# Patient Record
Sex: FEMALE | Race: BLACK OR AFRICAN AMERICAN | Employment: UNEMPLOYED | URBAN - METROPOLITAN AREA
[De-identification: names, ages, dates, MRNs, and addresses within clinical notes are randomized per-mention and may not be internally consistent; named-entity substitution may affect disease eponyms.]

---

## 2018-09-08 ENCOUNTER — APPOINTMENT (OUTPATIENT)
Dept: GENERAL RADIOLOGY | Age: 58
End: 2018-09-08
Attending: EMERGENCY MEDICINE
Payer: OTHER GOVERNMENT

## 2018-09-08 ENCOUNTER — HOSPITAL ENCOUNTER (EMERGENCY)
Age: 58
Discharge: ANOTHER HOSPITAL | End: 2018-09-10
Attending: EMERGENCY MEDICINE
Payer: OTHER GOVERNMENT

## 2018-09-08 DIAGNOSIS — R44.0 AUDITORY HALLUCINATION: ICD-10-CM

## 2018-09-08 DIAGNOSIS — R45.851 SUICIDAL IDEATION: Primary | ICD-10-CM

## 2018-09-08 LAB
AMPHET UR QL SCN: NEGATIVE
ANION GAP SERPL CALC-SCNC: 9 MMOL/L (ref 3–18)
BARBITURATES UR QL SCN: NEGATIVE
BASOPHILS # BLD: 0 K/UL (ref 0–0.1)
BASOPHILS NFR BLD: 0 % (ref 0–2)
BENZODIAZ UR QL: NEGATIVE
BUN SERPL-MCNC: 27 MG/DL (ref 7–18)
BUN/CREAT SERPL: 24 (ref 12–20)
CALCIUM SERPL-MCNC: 9.2 MG/DL (ref 8.5–10.1)
CANNABINOIDS UR QL SCN: NEGATIVE
CHLORIDE SERPL-SCNC: 103 MMOL/L (ref 100–108)
CO2 SERPL-SCNC: 28 MMOL/L (ref 21–32)
COCAINE UR QL SCN: POSITIVE
CREAT SERPL-MCNC: 1.13 MG/DL (ref 0.6–1.3)
DIFFERENTIAL METHOD BLD: ABNORMAL
EOSINOPHIL # BLD: 0.2 K/UL (ref 0–0.4)
EOSINOPHIL NFR BLD: 2 % (ref 0–5)
ERYTHROCYTE [DISTWIDTH] IN BLOOD BY AUTOMATED COUNT: 15.4 % (ref 11.6–14.5)
ETHANOL SERPL-MCNC: <3 MG/DL (ref 0–3)
GLUCOSE SERPL-MCNC: 131 MG/DL (ref 74–99)
HCT VFR BLD AUTO: 36.9 % (ref 35–45)
HDSCOM,HDSCOM: ABNORMAL
HGB BLD-MCNC: 12.3 G/DL (ref 12–16)
LYMPHOCYTES # BLD: 2.9 K/UL (ref 0.9–3.6)
LYMPHOCYTES NFR BLD: 24 % (ref 21–52)
MCH RBC QN AUTO: 31.8 PG (ref 24–34)
MCHC RBC AUTO-ENTMCNC: 33.3 G/DL (ref 31–37)
MCV RBC AUTO: 95.3 FL (ref 74–97)
METHADONE UR QL: NEGATIVE
MONOCYTES # BLD: 0.8 K/UL (ref 0.05–1.2)
MONOCYTES NFR BLD: 7 % (ref 3–10)
NEUTS SEG # BLD: 8.1 K/UL (ref 1.8–8)
NEUTS SEG NFR BLD: 67 % (ref 40–73)
OPIATES UR QL: NEGATIVE
PCP UR QL: NEGATIVE
PLATELET # BLD AUTO: 306 K/UL (ref 135–420)
PMV BLD AUTO: 10.2 FL (ref 9.2–11.8)
POTASSIUM SERPL-SCNC: 3.8 MMOL/L (ref 3.5–5.5)
RBC # BLD AUTO: 3.87 M/UL (ref 4.2–5.3)
SODIUM SERPL-SCNC: 140 MMOL/L (ref 136–145)
TROPONIN I SERPL-MCNC: <0.02 NG/ML (ref 0–0.04)
WBC # BLD AUTO: 12.1 K/UL (ref 4.6–13.2)

## 2018-09-08 PROCEDURE — 99285 EMERGENCY DEPT VISIT HI MDM: CPT

## 2018-09-08 PROCEDURE — 85025 COMPLETE CBC W/AUTO DIFF WBC: CPT | Performed by: EMERGENCY MEDICINE

## 2018-09-08 PROCEDURE — 80048 BASIC METABOLIC PNL TOTAL CA: CPT | Performed by: EMERGENCY MEDICINE

## 2018-09-08 PROCEDURE — 71045 X-RAY EXAM CHEST 1 VIEW: CPT

## 2018-09-08 PROCEDURE — 93005 ELECTROCARDIOGRAM TRACING: CPT

## 2018-09-08 PROCEDURE — 96375 TX/PRO/DX INJ NEW DRUG ADDON: CPT

## 2018-09-08 PROCEDURE — 74011250637 HC RX REV CODE- 250/637: Performed by: PHYSICIAN ASSISTANT

## 2018-09-08 PROCEDURE — 80307 DRUG TEST PRSMV CHEM ANLYZR: CPT | Performed by: EMERGENCY MEDICINE

## 2018-09-08 PROCEDURE — 96361 HYDRATE IV INFUSION ADD-ON: CPT

## 2018-09-08 PROCEDURE — 96374 THER/PROPH/DIAG INJ IV PUSH: CPT

## 2018-09-08 PROCEDURE — 84484 ASSAY OF TROPONIN QUANT: CPT | Performed by: EMERGENCY MEDICINE

## 2018-09-08 PROCEDURE — 74011250636 HC RX REV CODE- 250/636: Performed by: EMERGENCY MEDICINE

## 2018-09-08 RX ORDER — GABAPENTIN 300 MG/1
300 CAPSULE ORAL 3 TIMES DAILY
Status: DISCONTINUED | OUTPATIENT
Start: 2018-09-08 | End: 2018-09-10 | Stop reason: HOSPADM

## 2018-09-08 RX ORDER — ACETAMINOPHEN 500 MG
1000 TABLET ORAL
Status: COMPLETED | OUTPATIENT
Start: 2018-09-08 | End: 2018-09-09

## 2018-09-08 RX ORDER — OMEPRAZOLE 20 MG/1
20 CAPSULE, DELAYED RELEASE ORAL DAILY
COMMUNITY
End: 2018-09-18

## 2018-09-08 RX ORDER — HALOPERIDOL 5 MG/ML
5 INJECTION INTRAMUSCULAR
Status: COMPLETED | OUTPATIENT
Start: 2018-09-08 | End: 2018-09-08

## 2018-09-08 RX ORDER — DIPHENHYDRAMINE HYDROCHLORIDE 50 MG/ML
25 INJECTION, SOLUTION INTRAMUSCULAR; INTRAVENOUS ONCE
Status: COMPLETED | OUTPATIENT
Start: 2018-09-08 | End: 2018-09-08

## 2018-09-08 RX ORDER — DICLOFENAC SODIUM 10 MG/G
2 GEL TOPICAL 4 TIMES DAILY
COMMUNITY

## 2018-09-08 RX ORDER — TRAZODONE HYDROCHLORIDE 50 MG/1
100 TABLET ORAL
Status: DISCONTINUED | OUTPATIENT
Start: 2018-09-08 | End: 2018-09-10 | Stop reason: HOSPADM

## 2018-09-08 RX ORDER — DIVALPROEX SODIUM 500 MG/1
500 TABLET, EXTENDED RELEASE ORAL DAILY
COMMUNITY
End: 2018-09-18

## 2018-09-08 RX ORDER — DIVALPROEX SODIUM 250 MG/1
500 TABLET, DELAYED RELEASE ORAL DAILY
Status: DISCONTINUED | OUTPATIENT
Start: 2018-09-09 | End: 2018-09-10 | Stop reason: HOSPADM

## 2018-09-08 RX ORDER — GLUCOSAMINE SULFATE 1500 MG
1000 POWDER IN PACKET (EA) ORAL DAILY
COMMUNITY

## 2018-09-08 RX ORDER — METFORMIN HYDROCHLORIDE 500 MG/1
500 TABLET, FILM COATED, EXTENDED RELEASE ORAL
COMMUNITY
End: 2018-09-18

## 2018-09-08 RX ORDER — ATENOLOL 25 MG/1
25 TABLET ORAL DAILY
COMMUNITY
End: 2018-09-18

## 2018-09-08 RX ORDER — AMLODIPINE BESYLATE 5 MG/1
10 TABLET ORAL DAILY
Status: DISCONTINUED | OUTPATIENT
Start: 2018-09-08 | End: 2018-09-10 | Stop reason: HOSPADM

## 2018-09-08 RX ORDER — METFORMIN HYDROCHLORIDE 500 MG/1
500 TABLET, EXTENDED RELEASE ORAL
Status: DISCONTINUED | OUTPATIENT
Start: 2018-09-08 | End: 2018-09-10 | Stop reason: HOSPADM

## 2018-09-08 RX ORDER — AMLODIPINE BESYLATE 10 MG/1
10 TABLET ORAL DAILY
COMMUNITY
End: 2018-09-18

## 2018-09-08 RX ORDER — FAMOTIDINE 20 MG/1
20 TABLET, FILM COATED ORAL DAILY
Status: DISCONTINUED | OUTPATIENT
Start: 2018-09-09 | End: 2018-09-10 | Stop reason: HOSPADM

## 2018-09-08 RX ORDER — LANOLIN ALCOHOL/MO/W.PET/CERES
3 CREAM (GRAM) TOPICAL
Status: DISCONTINUED | OUTPATIENT
Start: 2018-09-08 | End: 2018-09-10 | Stop reason: HOSPADM

## 2018-09-08 RX ORDER — LANOLIN ALCOHOL/MO/W.PET/CERES
3 CREAM (GRAM) TOPICAL
COMMUNITY

## 2018-09-08 RX ORDER — GABAPENTIN 300 MG/1
300 CAPSULE ORAL 3 TIMES DAILY
COMMUNITY
End: 2018-09-18

## 2018-09-08 RX ORDER — TRAZODONE HYDROCHLORIDE 100 MG/1
100 TABLET ORAL
COMMUNITY
End: 2018-09-18

## 2018-09-08 RX ORDER — ATENOLOL 25 MG/1
25 TABLET ORAL
Status: COMPLETED | OUTPATIENT
Start: 2018-09-08 | End: 2018-09-08

## 2018-09-08 RX ORDER — ALBUTEROL SULFATE 90 UG/1
2 AEROSOL, METERED RESPIRATORY (INHALATION)
COMMUNITY

## 2018-09-08 RX ADMIN — ATENOLOL 25 MG: 25 TABLET ORAL at 19:18

## 2018-09-08 RX ADMIN — DIPHENHYDRAMINE HYDROCHLORIDE 25 MG: 50 INJECTION, SOLUTION INTRAMUSCULAR; INTRAVENOUS at 15:17

## 2018-09-08 RX ADMIN — AMLODIPINE BESYLATE 10 MG: 5 TABLET ORAL at 19:18

## 2018-09-08 RX ADMIN — HALOPERIDOL LACTATE 5 MG: 5 INJECTION, SOLUTION INTRAMUSCULAR at 15:19

## 2018-09-08 RX ADMIN — SODIUM CHLORIDE 1000 ML: 900 INJECTION, SOLUTION INTRAVENOUS at 15:16

## 2018-09-08 RX ADMIN — METFORMIN HYDROCHLORIDE 500 MG: 500 TABLET, EXTENDED RELEASE ORAL at 19:17

## 2018-09-08 NOTE — ED NOTES
PROGRESS NOTES 
 
3:30 PM 
Assumed care of patient from Dr. Dennis Najera at 3pm.  Examined patient at bedside, she is very tired from the meds. CXR no acute process, no white count to suggest infectious source. + cocaine, likely source of tachycardia along with anxiety and distress. Trop negative. EKG NSR. Medically cleared. Consulted tele-psych. Xr Chest [facility] Result Date: 9/8/2018 Chest, single view Indication: Chest pain. Comparison: None Findings:  Portable upright AP view of the chest was obtained. The lungs are underexpanded. Basilar crowding of bronchovascular structures present. There is no focal pneumonic consolidation, pneumothorax, or pleural effusion. Cardiac size and mediastinal contours appear within normal limits. No acute osseous normality demonstrated. Impression: Underexpanded lungs with associated basilar crowding of bronchovascular structures. No superimposed acute radiographic abnormality. Recent Results (from the past 12 hour(s)) CBC WITH AUTOMATED DIFF Collection Time: 09/08/18  1:45 PM  
Result Value Ref Range WBC 12.1 4.6 - 13.2 K/uL  
 RBC 3.87 (L) 4.20 - 5.30 M/uL  
 HGB 12.3 12.0 - 16.0 g/dL HCT 36.9 35.0 - 45.0 % MCV 95.3 74.0 - 97.0 FL  
 MCH 31.8 24.0 - 34.0 PG  
 MCHC 33.3 31.0 - 37.0 g/dL  
 RDW 15.4 (H) 11.6 - 14.5 % PLATELET 733 989 - 683 K/uL MPV 10.2 9.2 - 11.8 FL  
 NEUTROPHILS 67 40 - 73 % LYMPHOCYTES 24 21 - 52 % MONOCYTES 7 3 - 10 % EOSINOPHILS 2 0 - 5 % BASOPHILS 0 0 - 2 %  
 ABS. NEUTROPHILS 8.1 (H) 1.8 - 8.0 K/UL  
 ABS. LYMPHOCYTES 2.9 0.9 - 3.6 K/UL  
 ABS. MONOCYTES 0.8 0.05 - 1.2 K/UL  
 ABS. EOSINOPHILS 0.2 0.0 - 0.4 K/UL  
 ABS. BASOPHILS 0.0 0.0 - 0.1 K/UL  
 DF AUTOMATED METABOLIC PANEL, BASIC Collection Time: 09/08/18  1:45 PM  
Result Value Ref Range Sodium 140 136 - 145 mmol/L Potassium 3.8 3.5 - 5.5 mmol/L Chloride 103 100 - 108 mmol/L  
 CO2 28 21 - 32 mmol/L  Anion gap 9 3.0 - 18 mmol/L  
 Glucose 131 (H) 74 - 99 mg/dL BUN 27 (H) 7.0 - 18 MG/DL Creatinine 1.13 0.6 - 1.3 MG/DL  
 BUN/Creatinine ratio 24 (H) 12 - 20 GFR est AA >60 >60 ml/min/1.73m2 GFR est non-AA 50 (L) >60 ml/min/1.73m2 Calcium 9.2 8.5 - 10.1 MG/DL  
ETHYL ALCOHOL Collection Time: 09/08/18  1:45 PM  
Result Value Ref Range ALCOHOL(ETHYL),SERUM <3 0 - 3 MG/DL  
TROPONIN I Collection Time: 09/08/18  1:45 PM  
Result Value Ref Range Troponin-I, Qt. <0.02 0.0 - 0.045 NG/ML  
DRUG SCREEN, URINE Collection Time: 09/08/18  1:54 PM  
Result Value Ref Range BENZODIAZEPINES NEGATIVE  NEG    
 BARBITURATES NEGATIVE  NEG    
 THC (TH-CANNABINOL) NEGATIVE  NEG    
 OPIATES NEGATIVE  NEG    
 PCP(PHENCYCLIDINE) NEGATIVE  NEG    
 COCAINE POSITIVE (A) NEG    
 AMPHETAMINES NEGATIVE  NEG METHADONE NEGATIVE  NEG HDSCOM (NOTE) EKG, 12 LEAD, INITIAL Collection Time: 09/08/18  2:26 PM  
Result Value Ref Range Ventricular Rate 99 BPM  
 Atrial Rate 99 BPM  
 P-R Interval 184 ms QRS Duration 80 ms  
 Q-T Interval 372 ms QTC Calculation (Bezet) 477 ms Calculated P Axis 57 degrees Calculated R Axis -3 degrees Calculated T Axis 55 degrees Diagnosis Normal sinus rhythm Normal ECG No previous ECGs available Marco Burroughs PA-C

## 2018-09-08 NOTE — ED NOTES
PROGRESS NOTES 
 
4:47 PM 
Approved for inpatient psych by Dr. Taylor Solo. I Requested paperwork to be [facility] to [facility].   
 
6:52 PM 
No beds available with [employer] or [place]. Will continue to keep patient in ED until bed available. Meals and home daily meds will be ordered. Continue sitter. Patient is currently resting quietly in room. 1:24 AM 
Patient care will be transferred to Dr. Trinh Scales at shift change. Awaiting bed placement. Patient is sleeping.    
 
 
Marco Burroughs PA-C

## 2018-09-08 NOTE — ED NOTES
Voltaren, trazadone, atenolol, and gabapentin sealed in pouch [id number] Melatonin, vitamin D, gabapentin, omeprazole, amlodipine sealed in pouch [id number] Albuterol, divalproex, metformin, gabapentin sealed in pouch [id number] 
3 pouches taken to pharmacy
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
## 2018-09-08 NOTE — ED TRIAGE NOTES
Pt arrived via EMS with complaint of depression. Reports recent death of sister which \"made her depression worse. \" 
Denies pain. Requesting to use phone upon arrival to call [place] hotline and to \"eat my food. \"

## 2018-09-08 NOTE — PROGRESS NOTES
Spoke with Admin on duty at [facility] ([phone], ex [phone]) they were able to confirm that pt is active and connected with the [place]. However, if patient were to need inpatient admission, they do not have any beds available and are currently on diversion.
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
## 2018-09-09 LAB
ANION GAP BLD CALC-SCNC: 17 MMOL/L (ref 10–20)
BASOPHILS # BLD: 0 K/UL (ref 0–0.1)
BASOPHILS NFR BLD: 0 % (ref 0–2)
BUN BLD-MCNC: 23 MG/DL (ref 7–18)
CA-I BLD-MCNC: 1.14 MMOL/L (ref 1.12–1.32)
CHLORIDE BLD-SCNC: 103 MMOL/L (ref 100–108)
CO2 BLD-SCNC: 28 MMOL/L (ref 19–24)
CREAT UR-MCNC: 0.8 MG/DL (ref 0.6–1.3)
DIFFERENTIAL METHOD BLD: ABNORMAL
EOSINOPHIL # BLD: 0.6 K/UL (ref 0–0.4)
EOSINOPHIL NFR BLD: 6 % (ref 0–5)
ERYTHROCYTE [DISTWIDTH] IN BLOOD BY AUTOMATED COUNT: 15.4 % (ref 11.6–14.5)
GLUCOSE BLD STRIP.AUTO-MCNC: 140 MG/DL (ref 74–106)
HCT VFR BLD AUTO: 36.1 % (ref 35–45)
HCT VFR BLD CALC: 36 % (ref 36–49)
HGB BLD-MCNC: 11.6 G/DL (ref 12–16)
HGB BLD-MCNC: 12.2 G/DL (ref 12–16)
LYMPHOCYTES # BLD: 2.8 K/UL (ref 0.9–3.6)
LYMPHOCYTES NFR BLD: 27 % (ref 21–52)
MCH RBC QN AUTO: 31.7 PG (ref 24–34)
MCHC RBC AUTO-ENTMCNC: 32.1 G/DL (ref 31–37)
MCV RBC AUTO: 98.6 FL (ref 74–97)
MONOCYTES # BLD: 0.6 K/UL (ref 0.05–1.2)
MONOCYTES NFR BLD: 6 % (ref 3–10)
NEUTS SEG # BLD: 6.2 K/UL (ref 1.8–8)
NEUTS SEG NFR BLD: 61 % (ref 40–73)
PLATELET # BLD AUTO: 260 K/UL (ref 135–420)
PMV BLD AUTO: 10 FL (ref 9.2–11.8)
POTASSIUM BLD-SCNC: 4 MMOL/L (ref 3.5–5.5)
RBC # BLD AUTO: 3.66 M/UL (ref 4.2–5.3)
SODIUM BLD-SCNC: 143 MMOL/L (ref 136–145)
TROPONIN I BLD-MCNC: <0.04 NG/ML (ref 0–0.08)
WBC # BLD AUTO: 10.2 K/UL (ref 4.6–13.2)

## 2018-09-09 PROCEDURE — 74011250636 HC RX REV CODE- 250/636: Performed by: EMERGENCY MEDICINE

## 2018-09-09 PROCEDURE — 84484 ASSAY OF TROPONIN QUANT: CPT

## 2018-09-09 PROCEDURE — 74011250637 HC RX REV CODE- 250/637: Performed by: PHYSICIAN ASSISTANT

## 2018-09-09 PROCEDURE — 80047 BASIC METABLC PNL IONIZED CA: CPT

## 2018-09-09 PROCEDURE — 96372 THER/PROPH/DIAG INJ SC/IM: CPT

## 2018-09-09 PROCEDURE — 93005 ELECTROCARDIOGRAM TRACING: CPT

## 2018-09-09 PROCEDURE — 85025 COMPLETE CBC W/AUTO DIFF WBC: CPT | Performed by: EMERGENCY MEDICINE

## 2018-09-09 RX ORDER — DIPHENHYDRAMINE HYDROCHLORIDE 50 MG/ML
50 INJECTION, SOLUTION INTRAMUSCULAR; INTRAVENOUS ONCE
Status: COMPLETED | OUTPATIENT
Start: 2018-09-09 | End: 2018-09-09

## 2018-09-09 RX ORDER — HALOPERIDOL 5 MG/ML
10 INJECTION INTRAMUSCULAR
Status: COMPLETED | OUTPATIENT
Start: 2018-09-09 | End: 2018-09-09

## 2018-09-09 RX ADMIN — GABAPENTIN 300 MG: 300 CAPSULE ORAL at 09:42

## 2018-09-09 RX ADMIN — GABAPENTIN 300 MG: 300 CAPSULE ORAL at 02:00

## 2018-09-09 RX ADMIN — GABAPENTIN 300 MG: 300 CAPSULE ORAL at 18:13

## 2018-09-09 RX ADMIN — ACETAMINOPHEN 1000 MG: 500 TABLET, FILM COATED ORAL at 02:42

## 2018-09-09 RX ADMIN — HALOPERIDOL LACTATE 10 MG: 5 INJECTION, SOLUTION INTRAMUSCULAR at 09:43

## 2018-09-09 RX ADMIN — GABAPENTIN 300 MG: 300 CAPSULE ORAL at 23:28

## 2018-09-09 RX ADMIN — TRAZODONE HYDROCHLORIDE 100 MG: 50 TABLET ORAL at 23:27

## 2018-09-09 RX ADMIN — MELATONIN TAB 3 MG 3 MG: 3 TAB at 02:00

## 2018-09-09 RX ADMIN — TRAZODONE HYDROCHLORIDE 100 MG: 50 TABLET ORAL at 02:42

## 2018-09-09 RX ADMIN — DIVALPROEX SODIUM 500 MG: 250 TABLET, DELAYED RELEASE ORAL at 09:42

## 2018-09-09 RX ADMIN — AMLODIPINE BESYLATE 10 MG: 5 TABLET ORAL at 09:41

## 2018-09-09 RX ADMIN — METFORMIN HYDROCHLORIDE 500 MG: 500 TABLET, EXTENDED RELEASE ORAL at 19:09

## 2018-09-09 RX ADMIN — DIPHENHYDRAMINE HYDROCHLORIDE 50 MG: 50 INJECTION, SOLUTION INTRAMUSCULAR; INTRAVENOUS at 09:43

## 2018-09-09 RX ADMIN — FAMOTIDINE 20 MG: 20 TABLET ORAL at 09:43

## 2018-09-09 NOTE — ED NOTES
Patient continually asks for crackers and juice. Explained to patient that she had eaten dinner and a small snack would be given before she goes to sleep for the night since she is diabetic

## 2018-09-09 NOTE — ED NOTES
Ulises asked if patient could have crackers and juice. Informed ulises that she may have water but not juice since the patient is diabetic.

## 2018-09-09 NOTE — PROGRESS NOTES
Patient declined from the following facilities: 
-Wendy at [facility]- no available bed 
[facility]- no bed available Sent clinical and referrals to the following: 
-[facility]

## 2018-09-09 NOTE — ED NOTES
Assume care of patient, patient awake sitting on the side of the bed, calm and quiet, sitter at bedside

## 2018-09-09 NOTE — ED NOTES
Received call from [address] [place] [address], spoke with Toshia Terrell, will not accept patient at this time due to not medically stable

## 2018-09-09 NOTE — PROGRESS NOTES
Patient in need of voluntary psychiatric bed. She has been referred to and declined from the following facilities d/t bed availability: - [address] She has been referred to and clinical sent to the following facilities with limited bed availability: 
-[facility] [address]

## 2018-09-09 NOTE — PROGRESS NOTES
Call back from [facility]. Per psych resident, they have bed available and will be willing to accept but would like repeat labs, including troponin and a new EKG. Informed Dr. Finoa Davies and bedside RN.

## 2018-09-09 NOTE — ED NOTES
PROGRESS NOTES:  
 
4:34 PM 
Assumed care from Dr. Lyndsay Yap at 3pm.  Patient is resting comfortably in bed. She is awaiting transfer to [facility] for psych admission. Regular diabetic diet and home meds have been ordered. No active complaints at this time. 1:25 AM 
Correction to previous note- after discussion with charge nurse patient was actually denied placement at [facility] and we are still awaiting bed availability. Will have case management assess patient in am.  Patient has been cooperative, just asking for tylenol for chronic back pain and food. Daily home meds have been given.   Dr. Errol Puri will Assume care of patient at shift change 2am.   
 
 
Holley Burroughs PA-C

## 2018-09-09 NOTE — PROGRESS NOTES
Spoke with Tiffany Strange at [facility] [phone] (option 3) and notified her labs were collected and faxed. They still have a bed available and she confirmed they received results and will forward them to physician. Requested that she call back to main ED line with bed and accepting physician information. Dionte Hill

## 2018-09-09 NOTE — PROGRESS NOTES
Patient still in need of voluntary inpatient psychiatric bed. I spoke with the [address] on Duty for the [facility] again this morning who confirmed that patient is 100 % connected to the [place] and was discharged from the [facility] on 9/7. They are currently on diversion and are unable to provide an inpatient bed at this time. Spoke with Rajani Howard at [facility] to refer patient again - there are no beds available system-wide. Spoke with Yesi Mcgowan at [facility] - while they do have a female bed available she states they are only able to be reimbursed by the [place] if the patient is transferred from the [place] ED. Discussed barriers to placement with Dr. Ana Maria Doan. Paged [place] Psychiatrist on-call to discuss case directly with Dr. Ana Maria Doan as possible transfer to [place] ED might be most appropriate for patient.
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
## 2018-09-09 NOTE — ED NOTES
Sitter asked if patient could have a snack. Informed sitter and patient that dinner would be coming shortly. Patient began getting irritated. Patient asked provider . 3 packs of crackers and 2 juice cups given to patient

## 2018-09-09 NOTE — ED NOTES
Patient screaming out \"Doctor!!!\"  Asked patient what she needed and she yelled out \"I need the doctor, I need something for this pain\". Patient informed that the provider will be notified that she wants something for pain and that it is inappropriate to scream out when she has a sitter at her bedside who she can tell what she needs.

## 2018-09-09 NOTE — ED TRIAGE NOTES
Patient up to bathroom and informed the sitter that she started her menstrual period. Patient provided a sanitary pad

## 2018-09-09 NOTE — ED NOTES
Patient up at doctors desk, Yaz Madrigal continues to yell and pace around the unit, security called to assist with patient

## 2018-09-09 NOTE — ED NOTES
Bedside and Verbal shift change report given to Neptali Renteria (oncoming nurse) by Ricardo Rodriguez RN (offgoing nurse). Report included the following information SBAR, ED Summary, MAR and Recent Results.

## 2018-09-10 ENCOUNTER — HOSPITAL ENCOUNTER (INPATIENT)
Age: 58
LOS: 8 days | Discharge: HOME | DRG: 885 | End: 2018-09-18
Attending: PSYCHIATRY & NEUROLOGY | Admitting: PSYCHIATRY & NEUROLOGY
Payer: OTHER GOVERNMENT

## 2018-09-10 VITALS
DIASTOLIC BLOOD PRESSURE: 74 MMHG | RESPIRATION RATE: 16 BRPM | TEMPERATURE: 98.2 F | SYSTOLIC BLOOD PRESSURE: 122 MMHG | HEART RATE: 72 BPM | OXYGEN SATURATION: 98 %

## 2018-09-10 PROBLEM — F29 PSYCHOSIS (HCC): Status: ACTIVE | Noted: 2018-09-10

## 2018-09-10 LAB
APPEARANCE UR: CLEAR
ATRIAL RATE: 61 BPM
ATRIAL RATE: 99 BPM
BACTERIA URNS QL MICRO: NEGATIVE /HPF
BILIRUB UR QL: NEGATIVE
CALCULATED P AXIS, ECG09: 57 DEGREES
CALCULATED P AXIS, ECG09: 62 DEGREES
CALCULATED R AXIS, ECG10: -3 DEGREES
CALCULATED R AXIS, ECG10: 0 DEGREES
CALCULATED T AXIS, ECG11: 1 DEGREES
CALCULATED T AXIS, ECG11: 55 DEGREES
COLOR UR: YELLOW
DIAGNOSIS, 93000: NORMAL
DIAGNOSIS, 93000: NORMAL
EPITH CASTS URNS QL MICRO: NORMAL /LPF (ref 0–5)
GLUCOSE BLD STRIP.AUTO-MCNC: 166 MG/DL (ref 70–110)
GLUCOSE BLD STRIP.AUTO-MCNC: 174 MG/DL (ref 70–110)
GLUCOSE UR STRIP.AUTO-MCNC: NEGATIVE MG/DL
HCG UR QL: NEGATIVE
HGB UR QL STRIP: ABNORMAL
KETONES UR QL STRIP.AUTO: NEGATIVE MG/DL
LEUKOCYTE ESTERASE UR QL STRIP.AUTO: NEGATIVE
NITRITE UR QL STRIP.AUTO: NEGATIVE
P-R INTERVAL, ECG05: 184 MS
P-R INTERVAL, ECG05: 206 MS
PH UR STRIP: 7 [PH] (ref 5–8)
PROT UR STRIP-MCNC: NEGATIVE MG/DL
Q-T INTERVAL, ECG07: 372 MS
Q-T INTERVAL, ECG07: 448 MS
QRS DURATION, ECG06: 78 MS
QRS DURATION, ECG06: 80 MS
QTC CALCULATION (BEZET), ECG08: 450 MS
QTC CALCULATION (BEZET), ECG08: 477 MS
RBC #/AREA URNS HPF: NORMAL /HPF (ref 0–5)
SP GR UR REFRACTOMETRY: 1.02 (ref 1–1.03)
UROBILINOGEN UR QL STRIP.AUTO: 1 EU/DL (ref 0.2–1)
VENTRICULAR RATE, ECG03: 61 BPM
VENTRICULAR RATE, ECG03: 99 BPM
WBC URNS QL MICRO: NORMAL /HPF (ref 0–4)

## 2018-09-10 PROCEDURE — 74011250637 HC RX REV CODE- 250/637: Performed by: EMERGENCY MEDICINE

## 2018-09-10 PROCEDURE — 82962 GLUCOSE BLOOD TEST: CPT

## 2018-09-10 PROCEDURE — 81025 URINE PREGNANCY TEST: CPT | Performed by: EMERGENCY MEDICINE

## 2018-09-10 PROCEDURE — 74011250637 HC RX REV CODE- 250/637: Performed by: PSYCHIATRY & NEUROLOGY

## 2018-09-10 PROCEDURE — 81001 URINALYSIS AUTO W/SCOPE: CPT | Performed by: EMERGENCY MEDICINE

## 2018-09-10 PROCEDURE — 74011250637 HC RX REV CODE- 250/637: Performed by: PHYSICIAN ASSISTANT

## 2018-09-10 PROCEDURE — 74011250637 HC RX REV CODE- 250/637

## 2018-09-10 PROCEDURE — 65220000003 HC RM SEMIPRIVATE PSYCH

## 2018-09-10 RX ORDER — LORAZEPAM 2 MG/ML
2 INJECTION INTRAMUSCULAR
Status: DISCONTINUED | OUTPATIENT
Start: 2018-09-10 | End: 2018-09-18 | Stop reason: HOSPADM

## 2018-09-10 RX ORDER — HALOPERIDOL 5 MG/ML
5 INJECTION INTRAMUSCULAR
Status: DISCONTINUED | OUTPATIENT
Start: 2018-09-10 | End: 2018-09-10

## 2018-09-10 RX ORDER — ACETAMINOPHEN 325 MG/1
650 TABLET ORAL
Status: COMPLETED | OUTPATIENT
Start: 2018-09-10 | End: 2018-09-10

## 2018-09-10 RX ORDER — TRAZODONE HYDROCHLORIDE 50 MG/1
50 TABLET ORAL
Status: DISCONTINUED | OUTPATIENT
Start: 2018-09-10 | End: 2018-09-18 | Stop reason: HOSPADM

## 2018-09-10 RX ORDER — ONDANSETRON 4 MG/1
4 TABLET, ORALLY DISINTEGRATING ORAL
Status: ACTIVE | OUTPATIENT
Start: 2018-09-10 | End: 2018-09-10

## 2018-09-10 RX ORDER — HYDROXYZINE PAMOATE 25 MG/1
25 CAPSULE ORAL
Status: DISCONTINUED | OUTPATIENT
Start: 2018-09-10 | End: 2018-09-10 | Stop reason: HOSPADM

## 2018-09-10 RX ORDER — HALOPERIDOL 5 MG/1
5 TABLET ORAL
Status: DISCONTINUED | OUTPATIENT
Start: 2018-09-10 | End: 2018-09-18 | Stop reason: HOSPADM

## 2018-09-10 RX ORDER — LORAZEPAM 1 MG/1
1 TABLET ORAL
Status: DISCONTINUED | OUTPATIENT
Start: 2018-09-10 | End: 2018-09-18 | Stop reason: HOSPADM

## 2018-09-10 RX ORDER — ACETAMINOPHEN 325 MG/1
650 TABLET ORAL
Status: DISCONTINUED | OUTPATIENT
Start: 2018-09-10 | End: 2018-09-18 | Stop reason: HOSPADM

## 2018-09-10 RX ORDER — MAG HYDROX/ALUMINUM HYD/SIMETH 200-200-20
30 SUSPENSION, ORAL (FINAL DOSE FORM) ORAL
Status: COMPLETED | OUTPATIENT
Start: 2018-09-10 | End: 2018-09-10

## 2018-09-10 RX ORDER — HALOPERIDOL 5 MG/ML
5 INJECTION INTRAMUSCULAR
Status: DISCONTINUED | OUTPATIENT
Start: 2018-09-10 | End: 2018-09-18 | Stop reason: HOSPADM

## 2018-09-10 RX ORDER — ACETAMINOPHEN 325 MG/1
TABLET ORAL
Status: COMPLETED
Start: 2018-09-10 | End: 2018-09-10

## 2018-09-10 RX ORDER — ONDANSETRON 4 MG/1
TABLET, ORALLY DISINTEGRATING ORAL
Status: DISPENSED
Start: 2018-09-10 | End: 2018-09-10

## 2018-09-10 RX ADMIN — TRAZODONE HYDROCHLORIDE 50 MG: 50 TABLET ORAL at 21:04

## 2018-09-10 RX ADMIN — ACETAMINOPHEN 650 MG: 325 TABLET, FILM COATED ORAL at 18:49

## 2018-09-10 RX ADMIN — ALUMINUM HYDROXIDE, MAGNESIUM HYDROXIDE, AND SIMETHICONE 30 ML: 200; 200; 20 SUSPENSION ORAL at 05:54

## 2018-09-10 RX ADMIN — FAMOTIDINE 20 MG: 20 TABLET ORAL at 10:13

## 2018-09-10 RX ADMIN — DIVALPROEX SODIUM 500 MG: 250 TABLET, DELAYED RELEASE ORAL at 10:14

## 2018-09-10 RX ADMIN — ACETAMINOPHEN 650 MG: 325 TABLET ORAL at 04:36

## 2018-09-10 RX ADMIN — GABAPENTIN 300 MG: 300 CAPSULE ORAL at 10:15

## 2018-09-10 RX ADMIN — AMLODIPINE BESYLATE 10 MG: 5 TABLET ORAL at 10:14

## 2018-09-10 RX ADMIN — LORAZEPAM 1 MG: 1 TABLET ORAL at 21:04

## 2018-09-10 RX ADMIN — ACETAMINOPHEN 650 MG: 325 TABLET, FILM COATED ORAL at 04:36

## 2018-09-10 RX ADMIN — HYDROXYZINE PAMOATE 25 MG: 25 CAPSULE ORAL at 12:54

## 2018-09-10 NOTE — CONSULTS
Telepsychiatry Follow up    Location of patient: [facility]  Location of provider: [place]    This evaluation was conducted via telepsychiatry with the assistance of onsite staff. Interim History: 64 y/o female with reported history of depression and PTSD who presented to ED on 9/8 with complaint of SI and plan to cut wrists, as well as command auditory hallucinations. Also of note, pt smoked crack that day, was initially agitated and required Haldol in the ED. She was seen by telepsych on 9/8, with recommendation for voluntary admission at that time. Pt currently awaiting placement. On interview today, pt reports continuing to feel extremely depressed, as well as anxious being in the ED for so long. She reports continued SI, but no current plan. Also continues to endorse auditory hallucinations telling her to kill herself. States that this has happened before, usually just hears the voices when she is very depressed. She is not aware of prior dx of bipolar disorder or any thought disorders. She notes currently being in pain, both physically and emotionally. Has chronic muscle spasms, which she reports are usually treated with an injection. Denies HI. Pt states that she is unable to remember any prior medications that have been helpful for her current symptoms. She did receive a dose of Vistaril today, states that helped to calm her down.      Current Psychiatric Medications: Depakote 500 mg daily, Trazodone 100 mg qHS prn sleep, Melatonin 3 mg qHS prn sleep, Vistaril 25 mg TID prn anxiety (just started today)    Mental Status Exam:   Appearance and attire: overweight, somewhat disheveled, wearing hospital gown, sitting up  Attitude and behavior: Cooperative, calm but tearful  Speech: Somewhat low volume and slow  Mood: Dysthymic  Affect: Restricted  Association and thought processes: Goal-directed  Thought content: +SI, no current plan; Denies HI  Perception: +CAH to kill self; Does not appear to be responding to internal stimuli  Sensorium and orientation: Alert, oriented to self and situation  Memory and intellectual functioning: No gross impairments  Insight and judgment: Fair to poor    Diagnosis: F33.3  Major depressive disorder, recurrent, severe with psychotic features, provisional; F14.99  Unspecified cocaine use disorder; History of PTSD    Treatment Recommendations:  1. Disposition: voluntary for psychiatric admission, currently awaiting placement. 2. Psychiatric medications: No changes at this time. Vistaril helping for anxiety. Recommendations were discussed with pt who expressed understanding.

## 2018-09-10 NOTE — IP AVS SNAPSHOT
[address] [facility] [address] 
 
 
 [address] Patient: Manolo Santillan MRN: [id number] A check baudilio indicates which time of day the medication should be taken. My Medications START taking these medications Instructions Each Dose to Equal  
 Morning Noon Evening Bedtime  
 metFORMIN  mg tablet Commonly known as:  GLUCOPHAGE XR  
Replaces:  metFORMIN 500 mg Tg24 24 hour tablet Your last dose was: Your next dose is: Take 1 Tab by mouth daily (with dinner). Indications: type 2 diabetes mellitus 500 mg  
    
   
   
   
  
 pantoprazole 40 mg tablet Commonly known as:  PROTONIX Start taking on:  9/19/2018 Replaces:  omeprazole 20 mg capsule Your last dose was: Your next dose is: Take 1 Tab by mouth Daily (before breakfast). Indications: Gastric Ulcer 40 mg CHANGE how you take these medications Instructions Each Dose to Equal  
 Morning Noon Evening Bedtime  
 atenolol 25 mg tablet Commonly known as:  TENORMIN What changed:  when to take this Your last dose was: Your next dose is: Take 1 Tab by mouth every twelve (12) hours. Indications: chronic heart failure 25 mg  
    
   
   
   
  
 divalproex  mg ER tablet Commonly known as:  DEPAKOTE ER Start taking on:  9/19/2018 What changed:  how much to take Your last dose was: Your next dose is: Take 3 Tabs by mouth daily. Indications: DEPRESSION ASSOCIATED WITH BIPOLAR DISORDER  
 1500 mg CONTINUE taking these medications Instructions Each Dose to Equal  
 Morning Noon Evening Bedtime  
 albuterol 90 mcg/actuation inhaler Commonly known as:  PROVENTIL HFA, VENTOLIN HFA, PROAIR HFA Your last dose was: Your next dose is: Take 2 Puffs by inhalation. 2 Puff  
    
   
   
   
  
 amLODIPine 10 mg tablet Commonly known as:  Isaura Lobo Start taking on:  9/19/2018 Your last dose was: Your next dose is: Take 1 Tab by mouth daily. Indications: Chronic Stable Angina Pectoris 10 mg  
    
   
   
   
  
 gabapentin 300 mg capsule Commonly known as:  NEURONTIN Your last dose was: Your next dose is: Take 1 Cap by mouth three (3) times daily. Indications: NEUROPATHIC PAIN  
 300 mg  
    
   
   
   
  
 melatonin 3 mg tablet Your last dose was: Your next dose is: Take 3 mg by mouth. 3 mg  
    
   
   
   
  
 traZODone 100 mg tablet Commonly known as:  Ata Regan Your last dose was: Your next dose is: Take 1 Tab by mouth nightly. Indications: insomnia associated with depression 100 mg  
    
   
   
   
  
 VITAMIN D3 1,000 unit Cap Generic drug:  cholecalciferol Your last dose was: Your next dose is: Take 1,000 Units by mouth daily. 1000 Units VOLTAREN 1 % Gel Generic drug:  diclofenac Your last dose was: Your next dose is:    
   
   
 Apply 2 g to affected area four (4) times daily. 2 g  
    
   
   
   
  
  
STOP taking these medications   
 metFORMIN 500 mg Tg24 24 hour tablet Commonly known asKshabanai Da ER Replaced by:  metFORMIN  mg tablet  
   
  
 omeprazole 20 mg capsule Commonly known as:  PRILOSEC Replaced by:  pantoprazole 40 mg tablet Where to Get Your Medications Information on where to get these meds will be given to you by the nurse or doctor. ! Ask your nurse or doctor about these medications  
  amLODIPine 10 mg tablet  
 atenolol 25 mg tablet  
 divalproex  mg ER tablet  
 gabapentin 300 mg capsule  
 metFORMIN  mg tablet  
 pantoprazole 40 mg tablet  
 traZODone 100 mg tablet

## 2018-09-10 NOTE — ED NOTES
Note:  Assuming care of patient  
from leaving provider 2:43 AM 
Nuvia JUNIOR MD, assumed care of patient from another provider who is ending their shift in the emergency department . 2:43 AM 
 
Date: 9/8/2018 Patient Name: Naveen Mera History of Presenting Illness Chief Complaint Patient presents with  Mental Health Problem Nursing notes regarding the HPI and triage nursing notes were reviewed. Prior medical records were reviewed. Current Facility-Administered Medications Medication Dose Route Frequency Provider Last Rate Last Dose  acetaminophen (TYLENOL) tablet 650 mg  650 mg Oral NOW Marco A NITIN Burroughs-C      
 ondansetron (ZOFRAN ODT) tablet 4 mg  4 mg Oral NOW Marco A Heike PA-C      
 amLODIPine (NORVASC) tablet 10 mg  10 mg Oral DAILY Marco A NITIN Burroughs-C   10 mg at 09/09/18 2912  divalproex DR (DEPAKOTE) tablet 500 mg  500 mg Oral DAILY Marco A Heike PA-C   500 mg at 09/09/18 0942  
 gabapentin (NEURONTIN) capsule 300 mg  300 mg Oral TID Marco A Heike, PA-C   300 mg at 09/09/18 2328  melatonin tablet 3 mg  3 mg Oral QHS PRN Marco A Heike, PA-C   3 mg at 09/09/18 0200  
 metFORMIN ER (GLUCOPHAGE XR) tablet 500 mg  500 mg Oral DAILY WITH DINNER Marco A Heike PA-C   500 mg at 09/09/18 1909  famotidine (PEPCID) tablet 20 mg  20 mg Oral DAILY Marco A Heike PA-C   20 mg at 09/09/18 9994  traZODone (DESYREL) tablet 100 mg  100 mg Oral QHS PRN Marco A Heike, PA-C   100 mg at 09/09/18 2327 Current Outpatient Prescriptions Medication Sig Dispense Refill  diclofenac (VOLTAREN) 1 % gel Apply 2 g to affected area four (4) times daily.  albuterol (PROVENTIL HFA, VENTOLIN HFA, PROAIR HFA) 90 mcg/actuation inhaler Take 2 Puffs by inhalation.  traZODone (DESYREL) 100 mg tablet Take 100 mg by mouth nightly.  divalproex ER (DEPAKOTE ER) 500 mg ER tablet Take 500 mg by mouth daily.  cholecalciferol (VITAMIN D3) 1,000 unit cap Take 1,000 Units by mouth daily.  melatonin 3 mg tablet Take 3 mg by mouth.  amLODIPine (NORVASC) 10 mg tablet Take 10 mg by mouth daily.  gabapentin (NEURONTIN) 300 mg capsule Take 300 mg by mouth three (3) times daily.  atenolol (TENORMIN) 25 mg tablet Take 25 mg by mouth daily.  metFORMIN (GLUMETZA ER) 500 mg TG24 24 hour tablet Take 500 mg by mouth.  omeprazole (PRILOSEC) 20 mg capsule Take 20 mg by mouth daily. Past History Past Medical History: 
Past Medical History:  
Diagnosis Date  Arthritis  Depression  DM (diabetes mellitus) ([facility] [address]  HTN (hypertension)  Muscle spasm  Pancreatitis  PTSD (post-traumatic stress disorder) Past Surgical History: 
History reviewed. No pertinent surgical history. Family History: 
History reviewed. No pertinent family history. Social History: 
Social History Substance Use Topics  Smoking status: Current Every Day Smoker  Smokeless tobacco: Never Used  Alcohol use Yes Allergies: Allergies Allergen Reactions  [place] Unknown (comments) Patient's primary care provider (as noted in [facility]):  Not On File Bsi Abnormal lab results from this emergency department encounter: 
Labs Reviewed CBC WITH AUTOMATED DIFF - Abnormal; Notable for the following:   
    Result Value RBC 3.87 (*)   
 RDW 15.4 (*)   
 ABS. NEUTROPHILS 8.1 (*) All other components within normal limits METABOLIC PANEL, BASIC - Abnormal; Notable for the following:   
 Glucose 131 (*) BUN 27 (*)   
 BUN/Creatinine ratio 24 (*)   
 GFR est non-AA 50 (*) All other components within normal limits DRUG SCREEN, URINE - Abnormal; Notable for the following:   
 COCAINE POSITIVE (*) All other components within normal limits CBC WITH AUTOMATED DIFF - Abnormal; Notable for the following: RBC 3.66 (*) HGB 11.6 (*) MCV 98.6 (*) RDW 15.4 (*) EOSINOPHILS 6 (*)   
 ABS. EOSINOPHILS 0.6 (*) All other components within normal limits POC CHEM8 - Abnormal; Notable for the following:   
 CO2, POC 28 (*) Glucose,  (*)   
 BUN, POC 23 (*) All other components within normal limits GLUCOSE, POC - Abnormal; Notable for the following:   
 Glucose (POC) 166 (*) All other components within normal limits ETHYL ALCOHOL  
TROPONIN I  
POC TROPONIN-I  
POC CHEM8 Lab values for this patient within approximately the last 12 hours: 
Recent Results (from the past 12 hour(s)) CBC WITH AUTOMATED DIFF Collection Time: 09/09/18  2:52 PM  
Result Value Ref Range WBC 10.2 4.6 - 13.2 K/uL  
 RBC 3.66 (L) 4.20 - 5.30 M/uL  
 HGB 11.6 (L) 12.0 - 16.0 g/dL HCT 36.1 35.0 - 45.0 % MCV 98.6 (H) 74.0 - 97.0 FL  
 MCH 31.7 24.0 - 34.0 PG  
 MCHC 32.1 31.0 - 37.0 g/dL  
 RDW 15.4 (H) 11.6 - 14.5 % PLATELET 086 042 - 146 K/uL MPV 10.0 9.2 - 11.8 FL  
 NEUTROPHILS 61 40 - 73 % LYMPHOCYTES 27 21 - 52 % MONOCYTES 6 3 - 10 % EOSINOPHILS 6 (H) 0 - 5 % BASOPHILS 0 0 - 2 %  
 ABS. NEUTROPHILS 6.2 1.8 - 8.0 K/UL  
 ABS. LYMPHOCYTES 2.8 0.9 - 3.6 K/UL  
 ABS. MONOCYTES 0.6 0.05 - 1.2 K/UL  
 ABS. EOSINOPHILS 0.6 (H) 0.0 - 0.4 K/UL  
 ABS. BASOPHILS 0.0 0.0 - 0.1 K/UL  
 DF AUTOMATED    
GLUCOSE, POC Collection Time: 09/10/18  1:39 AM  
Result Value Ref Range Glucose (POC) 166 (H) 70 - 110 mg/dL Radiologist and cardiologist interpretations if available at time of this note: 
Radiology results: No results found. Medication(s) ordered for patient during this emergency visit encounter: 
Medications  
amLODIPine (NORVASC) tablet 10 mg (10 mg Oral Given 9/9/18 0941) divalproex DR (DEPAKOTE) tablet 500 mg (500 mg Oral Given 9/9/18 4698) gabapentin (NEURONTIN) capsule 300 mg (300 mg Oral Given 9/9/18 6733) melatonin tablet 3 mg (3 mg Oral Given 9/9/18 0200) metFORMIN ER (GLUCOPHAGE XR) tablet 500 mg (500 mg Oral Given 9/9/18 1909) famotidine (PEPCID) tablet 20 mg (20 mg Oral Given 9/9/18 0943) traZODone (DESYREL) tablet 100 mg (100 mg Oral Given 9/9/18 2327)  
acetaminophen (TYLENOL) tablet 650 mg (not administered)  
ondansetron (ZOFRAN ODT) tablet 4 mg (not administered)  
haloperidol lactate (HALDOL) injection 5 mg (5 mg IntraVENous Given 9/8/18 1519) diphenhydrAMINE (BENADRYL) injection 25 mg (25 mg IntraVENous Given 9/8/18 1517)  
sodium chloride 0.9 % bolus infusion 1,000 mL (0 mL IntraVENous IV Completed 9/8/18 1745) atenolol (TENORMIN) tablet 25 mg (25 mg Oral Given 9/8/18 1918)  
acetaminophen (TYLENOL) tablet 1,000 mg (1,000 mg Oral Given 9/9/18 0242)  
haloperidol lactate (HALDOL) injection 10 mg (10 mg IntraMUSCular Given 9/9/18 0943) diphenhydrAMINE (BENADRYL) injection 50 mg (50 mg IntraMUSCular Given 9/9/18 0943) Pt care assumed from Dr. Javy Simpson , ED provider. Pt complaint(s), current treatment plan, progression and available diagnostic results have been discussed thoroughly. Rounding occurred: no Intended Disposition: Transfer Pending diagnostic reports and/or labs (please list):  [facility] case management transfer of a voluntary SI patient with auditory hallucinations to a community behavioral medicine facility. Coding Diagnoses Clinical Impression: 1. Suicidal ideation 2. Auditory hallucination Disposition Disposition:  Transfer. Ariana Plaza M.D. PHILLY Board Certified Emergency Physician

## 2018-09-10 NOTE — ED NOTES
Medications deferred at this time as patient is resting with eyes closed and in no acute discomfort.

## 2018-09-10 NOTE — PROGRESS NOTES
Spoke with [facility] from [facility] and still no bed and advised to call them back between [phone] today.

## 2018-09-10 NOTE — ED NOTES
This patient has been recommended for inpatient treatment and is awaiting placement. The ED provider has reviewed the patients history and medications, diet, and treatments and will order as necessary. The patient will be observed and attended to as their medical needs require and/or policy dictates

## 2018-09-10 NOTE — ED NOTES
Verbal shift change report given to Yani Jack RN (oncoming nurse) by Cheikh Solorio RN (offgoing nurse). Report included the following information SBAR.

## 2018-09-10 NOTE — IP AVS SNAPSHOT
Summary of Care Report The Summary of Care report has been created to help improve care coordination. Users with access to [employer] or [address] (Web-based application) may access additional patient information including the Discharge Summary. If you are not currently a [employer] [facility] user and need more information, please call the number listed below in the [place] [address] section and ask to be connected with Medical Records. Facility Information Name Address Phone [address] [place] [address]  [address] [facility] [address] [phone] Patient Information Patient Name Sex  Katherine Kirby ([id number]) Female 1960 Discharge Information Admitting Provider Service Area Unit MD Dhara  [place] [address] / [phone] Discharge Provider Discharge Date/Time Discharge Disposition Destination (none) 2018 Midday (Pending) AHR (none) Patient Language Language ENGLISH [13] Hospital Problems as of 2018  Never Reviewed Class Noted - Resolved Last Modified POA Active Problems * (Principal)Major depressive disorder with psychotic features ([facility] [address]  9/10/2018 - Present 2018 by Evelin Traore MD Yes Entered by MD Dhara  
  Chronic post-traumatic stress disorder (PTSD)  2018 - Present 2018 by Evelin Traore MD Unknown Entered by Evelin Traore MD  
  Cocaine use disorder, severe, dependence ([facility] [address]  2018 - Present 2018 by Evelin Traore MD Yes Entered by Evelin Traore MD  
  
You are allergic to the following Allergen Reactions Strawberry Rash Unknown (comments) Strawberry preserve jelly Current Discharge Medication List  
  
START taking these medications Dose & Instructions Dispensing Information Comments metFORMIN  mg tablet Commonly known as:  GLUCOPHAGE XR  
Replaces:  metFORMIN 500 mg Tg24 24 hour tablet Dose:  500 mg Take 1 Tab by mouth daily (with dinner). Indications: type 2 diabetes mellitus Quantity:  30 Tab Refills:  0  
   
 pantoprazole 40 mg tablet Commonly known as:  PROTONIX Start taking on:  9/19/2018 Replaces:  omeprazole 20 mg capsule Dose:  40 mg Take 1 Tab by mouth Daily (before breakfast). Indications: Gastric Ulcer Quantity:  30 Tab Refills:  0 CONTINUE these medications which have CHANGED Dose & Instructions Dispensing Information Comments  
 atenolol 25 mg tablet Commonly known as:  TENORMIN What changed:  when to take this Dose:  25 mg Take 1 Tab by mouth every twelve (12) hours. Indications: chronic heart failure Quantity:  30 Tab Refills:  0  
   
 divalproex  mg ER tablet Commonly known as:  DEPAKOTE ER Start taking on:  9/19/2018 What changed:  how much to take Dose:  1500 mg Take 3 Tabs by mouth daily. Indications: DEPRESSION ASSOCIATED WITH BIPOLAR DISORDER Quantity:  30 Tab Refills:  0 CONTINUE these medications which have NOT CHANGED Dose & Instructions Dispensing Information Comments  
 albuterol 90 mcg/actuation inhaler Commonly known as:  PROVENTIL HFA, VENTOLIN HFA, PROAIR HFA Dose:  2 Puff Take 2 Puffs by inhalation. Refills:  0  
   
 amLODIPine 10 mg tablet Commonly known as:  [place] Start taking on:  9/19/2018 Dose:  10 mg Take 1 Tab by mouth daily. Indications: Chronic Stable Angina Pectoris Quantity:  30 Tab Refills:  0  
   
 gabapentin 300 mg capsule Commonly known as:  NEURONTIN Dose:  300 mg Take 1 Cap by mouth three (3) times daily. Indications: NEUROPATHIC PAIN Quantity:  90 Cap Refills:  0  
   
 melatonin 3 mg tablet Dose:  3 mg Take 3 mg by mouth. Refills:  0  
   
 traZODone 100 mg tablet Commonly known as:  Richard Vasquez  
 Dose:  100 mg Take 1 Tab by mouth nightly. Indications: insomnia associated with depression Quantity:  30 Tab Refills:  0  
   
 VITAMIN D3 1,000 unit Cap Generic drug:  cholecalciferol Dose:  1000 Units Take 1,000 Units by mouth daily. Refills:  0 VOLTAREN 1 % Gel Generic drug:  diclofenac Dose:  2 g Apply 2 g to affected area four (4) times daily. Refills:  0 STOP taking these medications Comments  
 metFORMIN 500 mg Tg24 24 hour tablet Commonly known asLonnie Simpler ER Replaced by:  metFORMIN  mg tablet  
   
   
 omeprazole 20 mg capsule Commonly known as:  PRILOSEC Replaced by:  pantoprazole 40 mg tablet Follow-up Information Follow up With Details Comments Contact Info Pt at this point NOT interested in local CSB services as she plans to relocate to [place]. Discharge Instructions BEHAVIORAL HEALTH NURSING DISCHARGE NOTE The following personal items collected during your admission are returned to you:  
Dental Appliance: Dental Appliances: None Vision: Visual Aid: None Hearing Aid:   
Jewelry: Jewelry: None Clothing: Clothing: Footwear, Shirt, Shorts, With patient Other Valuables: Other Valuables: Sarah Luis Waynetta Colas Valuables sent to safe: Personal Items Sent to Safe: none PATIENT INSTRUCTIONS: 
 
  
 
 
The discharge information has been reviewed with the patient. The patient verbalized understanding. Patient armband removed and shredded Chart Review Routing History No Routing History on File

## 2018-09-10 NOTE — IP AVS SNAPSHOT
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 Discharge Orders None A check baudilio indicates which time of day the medication should be taken. My Medications START taking these medications Instructions Each Dose to Equal  
 Morning Noon Evening Bedtime  
 metFORMIN  mg tablet Commonly known as:  GLUCOPHAGE XR  
Replaces:  metFORMIN 500 mg Tg24 24 hour tablet Your last dose was: Your next dose is: Take 1 Tab by mouth daily (with dinner). Indications: type 2 diabetes mellitus 500 mg  
    
   
   
   
  
 pantoprazole 40 mg tablet Commonly known as:  PROTONIX Start taking on:  9/19/2018 Replaces:  omeprazole 20 mg capsule Your last dose was: Your next dose is: Take 1 Tab by mouth Daily (before breakfast). Indications: Gastric Ulcer 40 mg CHANGE how you take these medications Instructions Each Dose to Equal  
 Morning Noon Evening Bedtime  
 atenolol 25 mg tablet Commonly known as:  TENORMIN What changed:  when to take this Your last dose was: Your next dose is: Take 1 Tab by mouth every twelve (12) hours. Indications: chronic heart failure  25 mg  
    
 divalproex  mg ER tablet Commonly known as:  DEPAKOTE ER Start taking on:  9/19/2018 What changed:  how much to take Your last dose was: Your next dose is: Take 3 Tabs by mouth daily. Indications: DEPRESSION ASSOCIATED WITH BIPOLAR DISORDER  
 1500 mg CONTINUE taking these medications Instructions Each Dose to Equal  
 Morning Noon Evening Bedtime  
 albuterol 90 mcg/actuation inhaler Commonly known as:  PROVENTIL HFA, VENTOLIN HFA, PROAIR HFA Your last dose was: Your next dose is: Take 2 Puffs by inhalation. 2 Puff  
    
   
   
   
  
 amLODIPine 10 mg tablet Commonly known as:  Hyman Cater Start taking on:  9/19/2018 Your last dose was: Your next dose is: Take 1 Tab by mouth daily. Indications: Chronic Stable Angina Pectoris 10 mg  
    
   
   
   
  
 gabapentin 300 mg capsule Commonly known as:  NEURONTIN Your last dose was: Your next dose is: Take 1 Cap by mouth three (3) times daily. Indications: NEUROPATHIC PAIN  
 300 mg  
    
   
   
   
  
 melatonin 3 mg tablet Your last dose was: Your next dose is: Take 3 mg by mouth. 3 mg  
    
   
   
   
  
 traZODone 100 mg tablet Commonly known as:  Gabriela Es Your last dose was: Your next dose is: Take 1 Tab by mouth nightly. Indications: insomnia associated with depression 100 mg  
    
   
   
   
  
 VITAMIN D3 1,000 unit Cap Generic drug:  cholecalciferol Your last dose was: Your next dose is: Take 1,000 Units by mouth daily. 1000 Units VOLTAREN 1 % Gel Generic drug:  diclofenac Your last dose was: Your next dose is:    
   
   
 Apply 2 g to affected area four (4) times daily. 2 g  
    
   
   
   
  
  
STOP taking these medications metFORMIN 500 mg Tg24 24 hour tablet Commonly known asCristela Rumpf ER Replaced by:  metFORMIN  mg tablet  
   
  
 omeprazole 20 mg capsule Commonly known as:  PRILOSEC Replaced by:  pantoprazole 40 mg tablet Where to Get Your Medications Information on where to get these meds will be given to you by the nurse or doctor. ! Ask your nurse or doctor about these medications  
  amLODIPine 10 mg tablet  
 atenolol 25 mg tablet  
 divalproex  mg ER tablet  
 gabapentin 300 mg capsule  
 metFORMIN  mg tablet  
 pantoprazole 40 mg tablet  
 traZODone 100 mg tablet Discharge Instructions BEHAVIORAL HEALTH NURSING DISCHARGE NOTE The following personal items collected during your admission are returned to you:  
Dental Appliance: Dental Appliances: None Vision: Visual Aid: None Hearing Aid:   
Jewelry: Jewelry: None Clothing: Clothing: Footwear, Shirt, Shorts, With patient Other Valuables: Other Valuables: Sarah Pleitez, [address] Valuables sent to safe: Personal Items Sent to Safe: none PATIENT INSTRUCTIONS: 
 
  
 
 
The discharge information has been reviewed with the patient. The patient verbalized understanding. Patient armband removed and shredded [employer] Announcement We are excited to announce that we are making your provider's discharge notes available to you in Standing Cloud. You will see these notes when they are completed and signed by the physician that discharged you from your recent hospital stay. If you have any questions or concerns about any information you see in Standing Cloud, please call the Health Information Department where you were seen or reach out to your Primary Care Provider for more information about your plan of care. Introducing [facility]!    
 [facility] introduces Standing Cloud patient portal. Now you can access parts of your medical record, email your doctor's office, and request medication refills online. 1. In your internet browser, go to [email or web address] 2. Click on the First Time User? Click Here link in the Sign In box. You will see the New Member Sign Up page. 3. Enter your FoodEssentials Access Code exactly as it appears below. You will not need to use this code after youve completed the sign-up process. If you do not sign up before the expiration date, you must request a new code. · FoodEssentials Access Code: [email or web address] Expires: 12/7/2018  1:35 PM 
 
4. Enter the last four digits of your Social Security Number (xxxx) and Date of Birth (mm/dd/yyyy) as indicated and click Submit. You will be taken to the next sign-up page. 5. Create a [employer] ID. This will be your FoodEssentials login ID and cannot be changed, so think of one that is secure and easy to remember. 6. Create a FoodEssentials password. You can change your password at any time. 7. Enter your Password Reset Question and Answer. This can be used at a later time if you forget your password. 8. Enter your e-mail address. You will receive e-mail notification when new information is available in [address] 9. Click Sign Up. You can now view and download portions of your medical record. 10. Click the Download Summary menu link to download a portable copy of your medical information. If you have questions, please visit the Frequently Asked Questions section of the FoodEssentials website. Remember, FoodEssentials is NOT to be used for urgent needs. For medical emergencies, dial 911. Now available from your iPhone and Android! Introducing Everett Leal As a [facility] patient, I wanted to make you aware of our electronic visit tool called Everett Leal. [facility] 24/7 allows you to connect within minutes with a medical provider 24 hours a day, seven days a week via a mobile device or tablet or logging into a secure website from your computer.   You can access [facility] SecTrulioo 24/7 from anywhere in the [place]. A virtual visit might be right for you when you have a simple condition and feel like you just dont want to get out of bed, or cant get away from work for an appointment, when your regular [employer] provider is not available (evenings, weekends or holidays), or when youre out of town and need minor care. Electronic visits cost only $49 and if the [employer] 24/7 provider determines a prescription is needed to treat your condition, one can be electronically transmitted to a nearby pharmacy*. Please take a moment to enroll today if you have not already done so. The enrollment process is free and takes just a few minutes. To enroll, please download the [employer] 24/7 brenda to your tablet or phone, or visit [email or web address] to enroll on your computer. And, as an [address] [place] [address] patient with a [employer] account, the results of your visits will be scanned into your electronic medical record and your primary care provider will be able to view the scanned results. We urge you to continue to see your regular [employer] provider for your ongoing medical care. And while your primary care provider may not be the one available when you seek a Everett Jesharonfin virtual visit, the peace of mind you get from getting a real diagnosis real time can be priceless. For more information on Everett Leal, view our Frequently Asked Questions (FAQs) at [email or web address]. Sincerely, 
 
Gigi Soto MD 
Chief Medical Officer [employer] Marcella Macias *:  certain medications cannot be prescribed via Everett Jana Mobile[employer]ia Providers Seen During Your Hospitalization Provider Specialty Primary office phone Sammy Lebron MD Psychiatry [phone] Your Primary Care Physician (PCP) Primary Care Physician Office Phone Office Fax OTHER, PHYS ** None ** ** None ** You are allergic to the following Allergen Reactions [place] Rash Unknown (comments) Strawberry preserve jelly Recent Documentation Height Weight Breastfeeding? BMI Smoking Status 1.676 m 135.6 kg No 48.26 kg/m2 Current Every Day Smoker Emergency Contacts Name Discharge Info Relation Home Work Mobile [place] CAREGIVER [3] Other Relative [6] [phone] Patient Belongings The following personal items are in your possession at time of discharge: 
  Dental Appliances: None  Visual Aid: None      Home Medications: Locked   Jewelry: None  Clothing: Footwear, Shirt, Shorts, With patient    Other Valuables: Cane, [employer], Purse  Personal Items Sent to Safe: none Please provide this summary of care documentation to your next provider. Signatures-by signing, you are acknowledging that this After Visit Summary has been reviewed with you and you have received a copy. Patient Signature:  ____________________________________________________________ Date:  ____________________________________________________________  
  
Naty  Provider Signature:  ____________________________________________________________ Date:  ____________________________________________________________

## 2018-09-10 NOTE — ED NOTES
I have reviewed discharge instructions with the patient. The patient verbalized understanding. Patient armband removed and shredded Pt VSS, NAD

## 2018-09-10 NOTE — PROGRESS NOTES
Spoke with Susana Jerez from [facility] and states that [facility] have 2 possible discharges today.
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
## 2018-09-10 NOTE — ED NOTES
06: 00:Pt care assumed from Dr. Froy Nguyen, ED provider. Pt complaint(s), current treatment plan, progression and available diagnostic results have been discussed thoroughly. Rounding occurred: N/A Intended Disposition: TBD Pending diagnostic reports and/or labs (please list): Pending case management placement 12:53 Pt is re-evaluated because she appears agitated, pacing in the room. Nurse asked me to give some anxiolytics, I evaluated the pt who is calm asking when she is getting a bed and if none are available would like to talk to psychiatry. We are talking to case management and last psych note evaluated is from 09/08.  
 
13:38 Consult:  Discussed care with Dr. Sangita Rosa (Telepsychiatry). Standard discussion; including history of patients chief complaint, available diagnostic results, and treatment course. Will evaluate the pt. Patient is transferred to a psychiatric facility for further management EMTALA completed. Disposition: Psychiatric facility Scribe Attestation Roman Dinero acting as a scribe for and in the presence of Marcella Regan MD     
September 10, 2018 at 7:38 AM 
    
Provider Attestation:     
I personally performed the services described in the documentation, reviewed the documentation, as recorded by the scribe in my presence, and it accurately and completely records my words and actions.  September 10, 2018 at 7:38 AM - Marcella Regan MD

## 2018-09-11 PROBLEM — F32.A DEPRESSIVE DISORDER: Status: ACTIVE | Noted: 2018-09-10

## 2018-09-11 PROBLEM — F32.3 MAJOR DEPRESSIVE DISORDER WITH PSYCHOTIC FEATURES (HCC): Status: ACTIVE | Noted: 2018-09-10

## 2018-09-11 PROBLEM — F43.12 CHRONIC POST-TRAUMATIC STRESS DISORDER (PTSD): Status: ACTIVE | Noted: 2018-09-11

## 2018-09-11 PROCEDURE — 65220000003 HC RM SEMIPRIVATE PSYCH

## 2018-09-11 PROCEDURE — 74011250637 HC RX REV CODE- 250/637: Performed by: PSYCHIATRY & NEUROLOGY

## 2018-09-11 RX ORDER — AMLODIPINE BESYLATE 10 MG/1
10 TABLET ORAL DAILY
Status: DISCONTINUED | OUTPATIENT
Start: 2018-09-11 | End: 2018-09-18 | Stop reason: HOSPADM

## 2018-09-11 RX ORDER — TRAZODONE HYDROCHLORIDE 100 MG/1
100 TABLET ORAL
Status: DISCONTINUED | OUTPATIENT
Start: 2018-09-11 | End: 2018-09-18 | Stop reason: HOSPADM

## 2018-09-11 RX ORDER — ATENOLOL 25 MG/1
25 TABLET ORAL EVERY 12 HOURS
Status: DISCONTINUED | OUTPATIENT
Start: 2018-09-11 | End: 2018-09-18 | Stop reason: HOSPADM

## 2018-09-11 RX ORDER — DIVALPROEX SODIUM 500 MG/1
500 TABLET, EXTENDED RELEASE ORAL DAILY
Status: DISCONTINUED | OUTPATIENT
Start: 2018-09-11 | End: 2018-09-13

## 2018-09-11 RX ORDER — MELATONIN
1000 DAILY
Status: DISCONTINUED | OUTPATIENT
Start: 2018-09-12 | End: 2018-09-18 | Stop reason: HOSPADM

## 2018-09-11 RX ORDER — ALBUTEROL SULFATE 90 UG/1
2 AEROSOL, METERED RESPIRATORY (INHALATION)
Status: DISCONTINUED | OUTPATIENT
Start: 2018-09-11 | End: 2018-09-11 | Stop reason: CLARIF

## 2018-09-11 RX ORDER — GABAPENTIN 300 MG/1
300 CAPSULE ORAL 3 TIMES DAILY
Status: DISCONTINUED | OUTPATIENT
Start: 2018-09-11 | End: 2018-09-18 | Stop reason: HOSPADM

## 2018-09-11 RX ORDER — LANOLIN ALCOHOL/MO/W.PET/CERES
3 CREAM (GRAM) TOPICAL
Status: DISCONTINUED | OUTPATIENT
Start: 2018-09-11 | End: 2018-09-18 | Stop reason: HOSPADM

## 2018-09-11 RX ORDER — PANTOPRAZOLE SODIUM 40 MG/1
40 TABLET, DELAYED RELEASE ORAL
Status: DISCONTINUED | OUTPATIENT
Start: 2018-09-12 | End: 2018-09-18 | Stop reason: HOSPADM

## 2018-09-11 RX ORDER — ALBUTEROL SULFATE 0.83 MG/ML
2.5 SOLUTION RESPIRATORY (INHALATION)
Status: DISCONTINUED | OUTPATIENT
Start: 2018-09-11 | End: 2018-09-18 | Stop reason: HOSPADM

## 2018-09-11 RX ORDER — METFORMIN HYDROCHLORIDE 500 MG/1
500 TABLET, EXTENDED RELEASE ORAL
Status: DISCONTINUED | OUTPATIENT
Start: 2018-09-11 | End: 2018-09-18 | Stop reason: HOSPADM

## 2018-09-11 RX ADMIN — ACETAMINOPHEN 650 MG: 325 TABLET, FILM COATED ORAL at 12:42

## 2018-09-11 RX ADMIN — ACETAMINOPHEN 650 MG: 325 TABLET, FILM COATED ORAL at 06:39

## 2018-09-11 RX ADMIN — ATENOLOL 25 MG: 25 TABLET ORAL at 21:07

## 2018-09-11 RX ADMIN — DIVALPROEX SODIUM 500 MG: 500 TABLET, EXTENDED RELEASE ORAL at 13:04

## 2018-09-11 RX ADMIN — MELATONIN TAB 3 MG 3 MG: 3 TAB at 21:08

## 2018-09-11 RX ADMIN — GABAPENTIN 300 MG: 300 CAPSULE ORAL at 21:08

## 2018-09-11 RX ADMIN — LORAZEPAM 1 MG: 1 TABLET ORAL at 12:43

## 2018-09-11 RX ADMIN — TRAZODONE HYDROCHLORIDE 100 MG: 100 TABLET ORAL at 21:08

## 2018-09-11 RX ADMIN — AMLODIPINE BESYLATE 10 MG: 10 TABLET ORAL at 13:04

## 2018-09-11 RX ADMIN — METFORMIN HYDROCHLORIDE 500 MG: 500 TABLET, EXTENDED RELEASE ORAL at 18:00

## 2018-09-11 RX ADMIN — GABAPENTIN 300 MG: 300 CAPSULE ORAL at 13:04

## 2018-09-11 RX ADMIN — ACETAMINOPHEN 650 MG: 325 TABLET, FILM COATED ORAL at 21:08

## 2018-09-11 NOTE — BH NOTES
GROUP THERAPY PROGRESS NOTE Danial Krabbe was encouraged by staff but refused to participate in  Community.

## 2018-09-11 NOTE — PROGRESS NOTES
Problem: Suicide/Homicide (Adult/Pediatric) Goal: *STG: Remains safe in hospital 
Patient will be assessed daily for safety. Outcome: Progressing Towards Goal 
Patient has remained safe. Goal: *STG: Seeks staff when feelings of self harm or harm towards others arise Patient will be assessed daily for suicidal ideations and contract for safety. Outcome: Progressing Towards Goal 
Patient contracts for safety. Goal: *STG/LTG: Complies with medication therapy Patient will take prescribed medication daily. Outcome: Progressing Towards Goal 
Patient is taking precribed medication. Comments: Patient has been out in day area today. She approached nurse to request her medication. She started to cry when she was asked about her medication. She was easily agitated and start to talk loud and state \"you don't want to make me mad\". She then sat down at the table and waited to talk with her doctor.
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
## 2018-09-11 NOTE — BSMART NOTE
SOCIAL WORK GROUP THERAPY PROGRESS NOTE Group Time:  11am 
 
Group Topic:  Coping Skills Group Participation:  
 
Pt expressed not interested in attending session despite staff support
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
## 2018-09-11 NOTE — BSMART NOTE
OCCUPATIONAL THERAPY PROGRESS NOTE Group Time:  8774 Attendance: The patient attended 1/4 of group. The patient left and returned to activity at least once. Participation The patient participated with minimal elaboration in the activity. Interaction: 
Called out several times and unable to participate much.

## 2018-09-12 PROCEDURE — 65220000003 HC RM SEMIPRIVATE PSYCH

## 2018-09-12 PROCEDURE — 74011250637 HC RX REV CODE- 250/637: Performed by: PSYCHIATRY & NEUROLOGY

## 2018-09-12 RX ADMIN — LORAZEPAM 1 MG: 1 TABLET ORAL at 21:00

## 2018-09-12 RX ADMIN — DIVALPROEX SODIUM 500 MG: 500 TABLET, EXTENDED RELEASE ORAL at 08:15

## 2018-09-12 RX ADMIN — VITAMIN D, TAB 1000IU (100/BT) 1000 UNITS: 25 TAB at 08:15

## 2018-09-12 RX ADMIN — TRAZODONE HYDROCHLORIDE 100 MG: 100 TABLET ORAL at 20:54

## 2018-09-12 RX ADMIN — ATENOLOL 25 MG: 25 TABLET ORAL at 08:15

## 2018-09-12 RX ADMIN — ATENOLOL 25 MG: 25 TABLET ORAL at 20:54

## 2018-09-12 RX ADMIN — AMLODIPINE BESYLATE 10 MG: 10 TABLET ORAL at 08:15

## 2018-09-12 RX ADMIN — GABAPENTIN 300 MG: 300 CAPSULE ORAL at 13:50

## 2018-09-12 RX ADMIN — GABAPENTIN 300 MG: 300 CAPSULE ORAL at 08:15

## 2018-09-12 RX ADMIN — GABAPENTIN 300 MG: 300 CAPSULE ORAL at 20:54

## 2018-09-12 RX ADMIN — METFORMIN HYDROCHLORIDE 500 MG: 500 TABLET, EXTENDED RELEASE ORAL at 17:12

## 2018-09-12 RX ADMIN — LORAZEPAM 1 MG: 1 TABLET ORAL at 08:20

## 2018-09-12 RX ADMIN — MELATONIN TAB 3 MG 3 MG: 3 TAB at 20:54

## 2018-09-12 RX ADMIN — PANTOPRAZOLE SODIUM 40 MG: 40 TABLET, DELAYED RELEASE ORAL at 06:44

## 2018-09-12 NOTE — BH NOTES
Pt spent most of the shift sitting in the day area watching TV. Pt refused off  The unit recreation. Pt  Appears guarded in the milieu no interacting with staff or peers. Pt. denies SI,HI and AVH today. Pt ate 100% of dinner and snack. Pt contracts for safety on the unit. Pt.denies any new medical/pain complaints. Pt. did not have any visitors. Staff encouraged Pt. to communicate concerns to the Treatment Team to ensure accurate treatment is addressed. Pt agreed. Pt. remain free of falls and provided non skid socks. Staff will continue to monitor Pt. for behavior safety and location.

## 2018-09-12 NOTE — BSMART NOTE
OCCUPATIONAL THERAPY PROGRESS NOTE Group Time:  7775 Attendance: The patient attended full group. Participation: The patient participated with minimal elaboration in the activity. Attention: The patient was able to focus on the activity. Interaction: The patient acknowledges others or responds to questions,  with no spontaneous interaction. Partaicipated as called on only. Minimal elaboration. Answers somewhat concrete, superficial with no mention of issues even when others discussing them.

## 2018-09-12 NOTE — PROGRESS NOTES
Problem: Depressed Mood (Adult/Pediatric) Goal: *STG: Remains safe in hospital 
Remains safe daily while in hospital.  
Outcome: Progressing Towards Goal 
Pt stated she felt safe in the hospital 
 
Problem: Suicide/Homicide (Adult/Pediatric) Goal: *STG: Attends activities and groups Patient will be encouraged to attend 2-3 groups daily. Outcome: Progressing Towards Goal 
Pt has participated in group today Problem: Psychosis Goal: *STG: Decreased hallucinations Patient will be assessed daily for auditory hallucinations and will have decrease or absence by discharge. Outcome: Progressing Towards Goal 
Pt stated she is not having SI or HI Comments: Pt has been up in the mileu most of the day. Pt was agitated earlier about forgetting her telephone numbers, but after assistance was provided she became calm. Pt denies SI, HI, or anxiety at this time. Pt has been med and diet compliant.

## 2018-09-12 NOTE — BH NOTES
Treatment team [facility] - Medical Director: _____present Psychiatrist: __x___present Charge nurse: _x____present MSW: _x____present : _____present Nurse Manager: _____present Student RNs: _____present Medical Students: _____present Art Therapist: _____present Clinical Coordinator: __x___present Occupational Therapist: _x____present : _______ present UR  __x_____ present Crisis Supervisor___x____present Plan of care discussed and updated as appropriate.

## 2018-09-12 NOTE — PROGRESS NOTES
[address] Inpatient Progress Note Date of Service: 09/12/18 Hospital Day: 2 Subjective/Interval History 09/12/18 Treatment Team Notes:  Notes reviewed and/or discussed and report that Dahlia Haji has been depressed and irritable. Patient interview: Dahlia Haji was interviewed by this writer today. Pt reports her mood is \"not good. I am trying to get to [place]. Can you call my payee? \" review of records indicate  discussed the issue of contact with payee with her yesterday and pt had agreed to contact her payee and ask him to send her money to her niece. Pt presents sedated and flat. She doses off during the interview. She also appears to have some cognitive impairment due to memory issues. Review of SW notes also indicate pt has significant issus with Cocaine dependence which have most likely contributed to homelessness as she has been spending her money on crack. Pt states she has not found a place to rent in [place] and wishes to move to [place]. This admission seems to be prompted by some secondary gain as pt was just discharged from [facility] and is homeless. Objective Visit Vitals  /81 (BP 1 Location: Right arm, BP Patient Position: At rest)  Pulse 64  Temp 98.6 °F (37 °C)  Resp 15  Ht 5' 6\" (1.676 m)  Wt 135.6 kg (299 lb)  Breastfeeding No  
 BMI 48.26 kg/m2 No results found for this or any previous visit (from the past 24 hour(s)). Mental Status Examination Appearance/Hygiene 63 y.o. BLACK OR  female Hygiene: Fair, dressed in hospital gown, morbidly obeses Behavior/Social Relatedness Very passive Musculoskeletal Gait/Station: appropriate Tone (flaccid, cogwheeling, spastic): not assessed Psychomotor (hyperkinetic, hypokinetic): calm Involuntary movements (tics, dyskinesias, akathisa, stereotypies): none Speech   Rate, rhythm, volume, fluency and articulation are appropriate Mood   depressed Affect    flat Thought Process Linear and goal directed Thought Content and Perceptual Disturbances Denies self-injurious behavior (SIB), suicidal ideation (SI), aggressive behavior or homicidal ideation (HI) Denies auditory and visual hallucinations Sensorium and Cognition  Grossly intact Insight  limited Judgment limited Assessment/Plan Psychiatric Diagnoses:  
Patient Active Problem List  
Diagnosis Code  Major depressive disorder with psychotic features ([facility] [address] F32.3  Chronic post-traumatic stress disorder (PTSD) F43.12 Medical Diagnoses: HTN, Diabetes and COPD Psychosocial and contextual factors: Homeless, chronic substance abuse, limited social support Level of impairment/disability: Torin Roman is a 63 y.o. who is currently admitted for suicidal ideation. 1.  Continue current medication regimen. 2.  Reviewed instructions, risks, benefits and side effects of medications 3. Disposition/Discharge Date: self-care/home, possible Friday. MD DR. [facility]

## 2018-09-12 NOTE — BH NOTES
GROUP THERAPY PROGRESS NOTE Niurka Barragan is participating in Recreational Therapy. Group time: 1 hour Personal goal for participation: get out in the heat Goal orientation: relaxation Group therapy participation: active Therapeutic interventions reviewed and discussed: The stress relieving power of physical activity Impression of participation: Pt enjoyed being out in the court yard and said the sweating was helping her stress melt away

## 2018-09-12 NOTE — BSMART NOTE
ART THERAPY GROUP PROGRESS NOTE PATIENT SCHEDULED FOR GROUP AT: 10:30 
 
ATTENDANCE: Full PARTICIPATION LEVEL: Participates fully in the art process ATTENTION LEVEL : Able to focus on task FOCUS: Goals SYMBOLIC & THEMATIC CONTENT AS NOTED IN IMAGERY: She presented with a blunted affect and kept to herself unless directly prompted. She utilized minimal effort on the task at hand. She claimed that she wishes to \"stop using substances, let go of \"so-called\" friends, and move to [place] with [her] mother. \" She claimed that her mother is willing to have her come move with her.

## 2018-09-12 NOTE — PROGRESS NOTES
conducted Spirituality Group for Hazel Cota, who is a 63 y.o.,female. Patients Primary Language is: [place]. According to the patients EMR Mandaen Affiliation is: Episcopal.  
 
The reason the Patient came to the hospital is:  
Patient Active Problem List  
 Diagnosis Date Noted  Chronic post-traumatic stress disorder (PTSD) 09/11/2018  Major depressive disorder with psychotic features ([facility] [address] 09/10/2018 The  provided the following Interventions: 
Continued the relationship of care and support. Listened empathically. Offered prayer and assurance of continued prayer on patients behalf. Chart reviewed. The following outcomes were achieved: 
Patient expressed gratitude for 's visit. w Assessment: 
There are no further spiritual or Synagogue issues which require Spiritual Care Services interventions at this time. Plan: 
Chaplains will continue to follow and will provide pastoral care on an as needed/requested basis.  recommends bedside caregivers page  on duty if patient shows signs of acute spiritual or emotional distress. [facility]  
[phone]

## 2018-09-12 NOTE — BH NOTES
CONOR SUMMESR Note:The above pt has been polite and cooperative and she has been focused on getting help with her stressors and she verbally contract for safety and denies any self-harm at this time. She has been compliant with medications this shift. -A-Problem #1 cont. -P-Maintain a safe and supportive environment.

## 2018-09-12 NOTE — BH NOTES
Pt has been isolating to self in day area. Pt verbalizes numerous somatic complaints to include \"whole body aches and sever cramps with my period that has been on for 7 days and is showing no signs of slowing down. \" Pt is not verbalizing current SI. Pt has attended groups with minimal participation. Pt compliant with meals and meds. Rounds maintained Q 15 mins. Staff will continue to offer a safe and supportive environment

## 2018-09-12 NOTE — PROGRESS NOTES
conducted an initial consultation and Spiritual Assessment for Shaniqua Navas, who is a 63 y.o.,female. Patients Primary Language is: [place]. According to the patients EMR Adventism Affiliation is: Cheondoism.  
 
The reason the Patient came to the hospital is:  
Patient Active Problem List  
 Diagnosis Date Noted  Chronic post-traumatic stress disorder (PTSD) 09/11/2018  Major depressive disorder with psychotic features ([facility] [address] 09/10/2018 The  provided the following Interventions: 
Initiated a relationship of care and support. Explored issues of james, belief, spirituality and Religion/ritual needs while hospitalized. Listened empathically. Provided chaplaincy education. Provided information about Spiritual Care Services. Offered prayer and assurance of continued prayers on patient's behalf. Chart reviewed. The following outcomes where achieved: 
Patient shared limited information about both their medical narrative and spiritual journey/beliefs. Patient processed feeling about current hospitalization. Patient expressed gratitude for 's visit. Assessment: 
Patient does not have any Religion/cultural needs that will affect patients preferences in health care. There are no spiritual or Religion issues which require intervention at this time. Plan: 
Chaplains will continue to follow and will provide pastoral care on an as needed/requested basis.  recommends bedside caregivers page  on duty if patient shows signs of acute spiritual or emotional distress. [address] [facility]  
[phone]

## 2018-09-13 PROBLEM — F14.20 COCAINE USE DISORDER, SEVERE, DEPENDENCE (HCC): Status: ACTIVE | Noted: 2018-09-13

## 2018-09-13 PROCEDURE — 74011250637 HC RX REV CODE- 250/637: Performed by: PSYCHIATRY & NEUROLOGY

## 2018-09-13 PROCEDURE — 65220000003 HC RM SEMIPRIVATE PSYCH

## 2018-09-13 RX ORDER — DIVALPROEX SODIUM 500 MG/1
1000 TABLET, EXTENDED RELEASE ORAL DAILY
Status: DISCONTINUED | OUTPATIENT
Start: 2018-09-14 | End: 2018-09-16

## 2018-09-13 RX ADMIN — GABAPENTIN 300 MG: 300 CAPSULE ORAL at 14:13

## 2018-09-13 RX ADMIN — ACETAMINOPHEN 650 MG: 325 TABLET, FILM COATED ORAL at 16:18

## 2018-09-13 RX ADMIN — METFORMIN HYDROCHLORIDE 500 MG: 500 TABLET, EXTENDED RELEASE ORAL at 16:18

## 2018-09-13 RX ADMIN — ACETAMINOPHEN 650 MG: 325 TABLET, FILM COATED ORAL at 10:18

## 2018-09-13 RX ADMIN — TRAZODONE HYDROCHLORIDE 100 MG: 100 TABLET ORAL at 21:00

## 2018-09-13 RX ADMIN — DIVALPROEX SODIUM 500 MG: 500 TABLET, EXTENDED RELEASE ORAL at 08:34

## 2018-09-13 RX ADMIN — GABAPENTIN 300 MG: 300 CAPSULE ORAL at 21:09

## 2018-09-13 RX ADMIN — LORAZEPAM 1 MG: 1 TABLET ORAL at 16:18

## 2018-09-13 RX ADMIN — PANTOPRAZOLE SODIUM 40 MG: 40 TABLET, DELAYED RELEASE ORAL at 06:50

## 2018-09-13 RX ADMIN — GABAPENTIN 300 MG: 300 CAPSULE ORAL at 08:34

## 2018-09-13 RX ADMIN — ATENOLOL 25 MG: 25 TABLET ORAL at 21:00

## 2018-09-13 RX ADMIN — VITAMIN D, TAB 1000IU (100/BT) 1000 UNITS: 25 TAB at 08:34

## 2018-09-13 RX ADMIN — ATENOLOL 25 MG: 25 TABLET ORAL at 08:34

## 2018-09-13 RX ADMIN — MELATONIN TAB 3 MG 3 MG: 3 TAB at 21:00

## 2018-09-13 RX ADMIN — AMLODIPINE BESYLATE 10 MG: 10 TABLET ORAL at 08:34

## 2018-09-13 NOTE — BSMART NOTE
SOCIAL WORK GROUP THERAPY PROGRESS NOTE Group Time:  11am 
 
Group Topic:  Coping Skills    C D Issues Group Participation:   
 
Pt sat In but not involved during group discussion but remained attentive Emphasis of session was presentation of basic \"CBT\" principles including diagram of the process, as well as list of typical cognitive distortions.

## 2018-09-13 NOTE — PROGRESS NOTES
[address] Inpatient Progress Note Date of Service: 09/13/18 Hospital Day: 3 Subjective/Interval History 09/13/18 Treatment Team Notes:  Notes reviewed and/or discussed and report that Fransico Saleh has been entitled and demanding with angry affect on the milieu. Her mood is labile with irritability. Patient interview: Fransico Saleh was interviewed by this writer today. Pt is focused on speaking with her payee and having the  speak with her payee. Sharan Wilkins has already talked with her payee today. Pt says her payee needs to send her money so she can get a hotel room and plan her trip to [place]. She says her niece will not allow her spend the night in her house. She also mentions that when she gets to [place], she will go visit her mother and sister and then check herself into the [address] [place] [address] so she can have a place to sleep as her sister will not also allow her stay in her house. Pt is using hospitalizations for secondary gain of shelter and seems to be hoping from hospital to hospital due to homelessness and poor choices with her finances. Pt left interview room before assessment was over because she had to find someone help her call her payee. Objective Visit Vitals  /70 (BP 1 Location: Right arm, BP Patient Position: Sitting)  Pulse 67  Temp 98.8 °F (37.1 °C)  Resp 15  Ht 5' 6\" (1.676 m)  Wt 135.6 kg (299 lb)  Breastfeeding No  
 BMI 48.26 kg/m2 No results found for this or any previous visit (from the past 24 hour(s)). Mental Status Examination Appearance/Hygiene 63 y.o. BLACK OR  female Hygiene: Fair, dressed in hospital gown, morbidly obeses Behavior/Social Relatedness Entitled, insolent Musculoskeletal Gait/Station: appropriate Tone (flaccid, cogwheeling, spastic): not assessed Psychomotor (hyperkinetic, hypokinetic): calm Involuntary movements (tics, dyskinesias, akathisa, stereotypies): none Speech   Rate, rhythm,  fluency and articulation are appropriate. Loud volume Mood   Irritable, angry Affect    congruent Thought Process Linear and goal directed Thought Content and Perceptual Disturbances Denies self-injurious behavior (SIB), suicidal ideation (SI), aggressive behavior or homicidal ideation (HI) Denies auditory and visual hallucinations Sensorium and Cognition  Grossly intact Insight  limited Judgment limited Assessment/Plan Psychiatric Diagnoses:  
Patient Active Problem List  
Diagnosis Code  Major depressive disorder with psychotic features ([facility] [address] F32.3  Chronic post-traumatic stress disorder (PTSD) F43.12 Medical Diagnoses: HTN, Diabetes and COPD Psychosocial and contextual factors: Homeless, chronic substance abuse, limited social support Level of impairment/disability: Torin Nguyen is a 63 y.o. who is currently admitted for suicidal ideation. 1.  Increase Depakote ER to 1000mg qhs. 
2.  Reviewed instructions, risks, benefits and side effects of medications 3. Disposition/Discharge Date: self-care/home, possible Friday. MD [facility]

## 2018-09-13 NOTE — BSMART NOTE
SW Contact: Collateral: spoke to pt's payee. .. Mr Nicky Keller # [phone]. .. Fax# [phone]. .. He reports pt receives about $3300 / month for [employer] disability. .. & CURRENTLY ON HOLD. .. DUE TO HER RECENT POOR CHOICES & NON COMPLIANCE WITH   [facility]. SINCE  about April 2018, she left an apartment she had for undisclosed reasons, has been using drugs, in / out of [facility] hospital as well as homeless. ... HE REPORTS THAT ONLY WAY TO GET FUNDS (if pt planning to relocate to [place]) would for him to get FAX of specific plan. .. Where she'll stay there, with whom, location, how getting there & where she'll stay. . After [facility] d/c. Arturo Haq Until she leaves area. .. HE ALSO EXPLAINED THIS TO PT & SHE WILL GATHER THIS INFO TO FAX TO HIM. .. WILL REVIEW WITH WRITER.

## 2018-09-13 NOTE — BH NOTES
GROUP THERAPY PROGRESS NOTE Kervin Swain is participating in Plain Dealing. Group time: 30 minutes Personal goal for participation: \"contact \" Goal orientation: personal 
 
Group therapy participation: active Therapeutic interventions reviewed and discussed: discuss daily Tx goal(s); discuss guideline compliance, unit issues and community announcements

## 2018-09-13 NOTE — BH NOTES
Pt has been to desk numerous times demanding to speak with MD and SW. Pt's payee number dialed multiple times by staff and payee has requested that pt not call him so frequently and stated he is not able to talk to her that often.

## 2018-09-13 NOTE — BH NOTES
GROUP THERAPY PROGRESS NOTE Shaniqua Navas is participating in Recreational Therapy. Group time: 30 minutes Personal goal for participation: to engage in approapriate social strengthening with peers Goal orientation: social 
 
Group therapy participation: active Therapeutic interventions reviewed and discussed: staff encouraged pt to attempt assertive social practices Impression of participation: pt actively engaged in ping pong with peer and demonstrated happiness while interacting during the game

## 2018-09-13 NOTE — BSMART NOTE
OCCUPATIONAL THERAPY PROGRESS NOTE Group Time:  1567 Attendance: The patient attended full group. Participation: The patient participated with minimal elaboration in the activity. Attention: The patient needed frequent redirection to activity. Taina Thomason Interaction: The patient acknowledges others or responds to questions,  with no spontaneous interaction. Marginally participated in discussion on stressors and stress management, no disclosure, concrete, limited attention to activity or peers.

## 2018-09-13 NOTE — PROGRESS NOTES
Problem: Suicide/Homicide (Adult/Pediatric) Goal: *STG: Remains safe in hospital 
Patient will be assessed daily for safety. Outcome: Progressing Towards Goal 
Pt has not engaged in any self injurious behaviors Goal: *STG/LTG: Complies with medication therapy Patient will take prescribed medication daily. Outcome: Progressing Towards Goal 
Pt compliant with prescribed medications Comments: Pt has been in day area interacting with select peers. Pt attends select groups. Pt endorses intermittent SI. Pt presents with angry mood and affect. Pt is entitled and demanding. Rounds maintained Q 15 mins. Staff will continue to offer a safe and supportive environment

## 2018-09-13 NOTE — BH NOTES
GROUP THERAPY PROGRESS NOTE Fransico Saleh is participating in AA/Emotions Anonymous Group Group time: 6413-0024 Personal goal for participation:  How to know When to Seek Treatment for Alcoholism. Anyone experiencing emotional difficulties. Goal orientation: active Group therapy participation: fully participated Therapeutic interventions reviewed and discussed: When people talk about what is going on in their lives it allows them to release some of their pent up stress. To gain knowledge and support from others who have had or are currently experiencing similar issues. Impression of participation:  [employer] members reviewed a film, then  Discussed the importance of sharing at [facility] , help yourself out of depression, leave anxiety behind, and controlling your fears. .  Pt. Received information  for both programs and shared while in group

## 2018-09-13 NOTE — BSMART NOTE
ART THERAPY GROUP PROGRESS NOTE PATIENT SCHEDULED FOR GROUP AT: 257 ATTENDANCE: 1/4 PARTICIPATION LEVEL: Participates minimally ATTENTION LEVEL: Needs occasional re-direction FOCUS: Gratitude SYMBOLIC & THEMATIC CONTENT AS NOTED IN IMAGERY: She lacked investment in the task at hand and was present for only about 1/4 of group. She utilized minimal effort in group discussion and offered general responses to questions.

## 2018-09-14 PROCEDURE — 74011250637 HC RX REV CODE- 250/637: Performed by: PSYCHIATRY & NEUROLOGY

## 2018-09-14 PROCEDURE — 65220000003 HC RM SEMIPRIVATE PSYCH

## 2018-09-14 RX ORDER — ESCITALOPRAM OXALATE 10 MG/1
10 TABLET ORAL EVERY EVENING
Status: DISCONTINUED | OUTPATIENT
Start: 2018-09-14 | End: 2018-09-18 | Stop reason: HOSPADM

## 2018-09-14 RX ADMIN — DIVALPROEX SODIUM 1000 MG: 500 TABLET, EXTENDED RELEASE ORAL at 08:17

## 2018-09-14 RX ADMIN — MELATONIN TAB 3 MG 3 MG: 3 TAB at 21:04

## 2018-09-14 RX ADMIN — ATENOLOL 25 MG: 25 TABLET ORAL at 21:04

## 2018-09-14 RX ADMIN — GABAPENTIN 300 MG: 300 CAPSULE ORAL at 08:14

## 2018-09-14 RX ADMIN — LORAZEPAM 1 MG: 1 TABLET ORAL at 16:18

## 2018-09-14 RX ADMIN — PANTOPRAZOLE SODIUM 40 MG: 40 TABLET, DELAYED RELEASE ORAL at 06:36

## 2018-09-14 RX ADMIN — VITAMIN D, TAB 1000IU (100/BT) 1000 UNITS: 25 TAB at 08:14

## 2018-09-14 RX ADMIN — LORAZEPAM 1 MG: 1 TABLET ORAL at 08:17

## 2018-09-14 RX ADMIN — GABAPENTIN 300 MG: 300 CAPSULE ORAL at 14:19

## 2018-09-14 RX ADMIN — TRAZODONE HYDROCHLORIDE 100 MG: 100 TABLET ORAL at 21:04

## 2018-09-14 RX ADMIN — ATENOLOL 25 MG: 25 TABLET ORAL at 08:14

## 2018-09-14 RX ADMIN — GABAPENTIN 300 MG: 300 CAPSULE ORAL at 21:04

## 2018-09-14 RX ADMIN — ESCITALOPRAM OXALATE 10 MG: 10 TABLET ORAL at 18:01

## 2018-09-14 RX ADMIN — AMLODIPINE BESYLATE 10 MG: 10 TABLET ORAL at 08:14

## 2018-09-14 RX ADMIN — METFORMIN HYDROCHLORIDE 500 MG: 500 TABLET, EXTENDED RELEASE ORAL at 16:16

## 2018-09-14 NOTE — BSMART NOTE
SW Contact: The following discharge plan has been agreed to by pt Oliver Thompson, MAC (S W assigned to Ms Patrick ford) . .. This information to be faxed to pt's PAYEE  Mr Tino Álvarez, #[phone]  Fax #[phone]. ... who upon receipt will release pt's funds, contingent on approval.... Pt Requests her check be sent to her niece;    Funds to be used for Plane Ticket to move to sister:   
     [place] [address]. #[phone]                                                                       #[phone] Once there pt to admit self to local [facility] will give pt her check & help pt coordinate transportation to [place]. Pt agrees to comply with all above. Fadi Teixeira____________________________             
 
SHARON Anand LCSW_____________________

## 2018-09-14 NOTE — BSMART NOTE
OCCUPATIONAL THERAPY PROGRESS NOTE Group Time:  1430 Attendance: The patient attended full group. Participation: The patient participated with minimal elaboration in the activity. Attention: The patient needed redirection to activity at least once. Interaction: The patient acknowledges others when asked to, or responds to questions,  with no spontaneous interaction. The patient shows minimal awareness of others. Participation minimal and only on approach with encouragement.

## 2018-09-14 NOTE — BH NOTES
Pt was within the therapeutic milieu during the evening shift on STU1. Pt minimally interacted with peers and was mostly isolative to self while in the day area. Pt frequently sought out staff concerning her VA benefits payee. Pt was able to contact her [place] payee concerning her benefits. Pt was observed to be agitated while have a conversation on the patient phone during the shift. Pt did attend groups, was meal compliant, and medication compliant during this shift. Pt denies SI/HI and AVH.

## 2018-09-14 NOTE — BSMART NOTE
SOCIAL WORK GROUP THERAPY PROGRESS NOTE Group Time:  11am  
 
Group Topic:  Coping Skills    C D Issues Group Participation:   
 
Pt moderately involved during group discussion but remained attentive. Members discussed handout on the role of \"neurotransmitters\" and how they regulate different aspects of one's moods, cognitions & related behavior. Pt identified several positive self statements she needs to start to endorse.

## 2018-09-14 NOTE — BSMART NOTE
SW Contact: Copy of received fax to her payee, has been shown to pt & put in her record on unit. Pt understands it may take until Monday for her check to arrive at niece's house. ... Pt agreed to continue to try & participate in unit groups & activities.

## 2018-09-14 NOTE — BH NOTES
MHT NOTE : The pt was slightly agitated today due to the fact that she needed to speak to her , whom has been in contact with her VA benefits payee. The  informed the pt that they would speak to her payee as soon as he could and they would get it figured out. The pt has been extremely eager to leave [place] and move to [place]. She stated/ yelled to the staff, \" I NEED TO GET TO [place] TO SEE MY MOM SHE IS SICK!!!!!!.\" Since then she has appeared to slightly relax. She participated in breakfast, lunch and the few groups that were held today by staff. The pt has complied with utilizing the non-skid footwear that was provided to her for her safety, and she has not experienced any falls. She will continue to be monitored for behavior,location and safety for the remaining duration of the shift.

## 2018-09-14 NOTE — PROGRESS NOTES
[address] Physician Daily Progress Note Patient:  Ember Harris Age:  63 y.o. :  1960 SEX:  female MRN:  [id number] [facility]:  [id number] Admit Date:  9/10/2018 Attending:  MD Dhara 
 
Subjective:  63[place] [address] female with a history of Depression, PTSD, HTN and DM who was transferred voluntarily from [facility] for suicidal ideation with plan to cut wrists. Patient is generaly isolative and has not been participating in group /milieu activities. She reports feeling depressed and grieving death of sister who  last weeks. Also worried about her 80year old who is in [place]. She is currently on Depakote, denies any clear manic history. Discussed adding SSRI.    
 
Current Medications:   
Current Facility-Administered Medications Medication Dose Route Frequency Provider Last Rate Last Dose  divalproex ER (DEPAKOTE ER) 24 hour tablet 1,000 mg  1,000 mg Oral DAILY Ella Walker MD   1,000 mg at 18 0817  
 amLODIPine (NORVASC) tablet 10 mg  10 mg Oral DAILY Tariq Hopper MD   10 mg at 18 3183  atenolol (TENORMIN) tablet 25 mg  25 mg Oral Q12H Tariq Hopper MD   25 mg at 18 3977  cholecalciferol (VITAMIN D3) tablet 1,000 Units  1,000 Units Oral DAILY Tariq Hopper MD   1,000 Units at 18 2268  
 gabapentin (NEURONTIN) capsule 300 mg  300 mg Oral TID Tariq Hopper MD   300 mg at 18 0814  
 melatonin tablet 3 mg  3 mg Oral QHS Ella Walker MD   3 mg at 18 2100  
 metFORMIN ER (GLUCOPHAGE XR) tablet 500 mg  500 mg Oral DAILY WITH DINNER Tariq Hopper MD   500 mg at 18 8882  pantoprazole (PROTONIX) tablet 40 mg  40 mg Oral ACB Ella Walker MD   40 mg at 18 5097  traZODone (DESYREL) tablet 100 mg  100 mg Oral QHS Ella Walker MD   100 mg at 18 2100  albuterol (PROVENTIL VENTOLIN) nebulizer solution 2.5 mg  2.5 mg Nebulization Q4H PRN Alondra Cruz MD      
 traZODone (DESYREL) tablet 50 mg  50 mg Oral QHS PRN Alondra Cruz MD   50 mg at 09/10/18 2104  LORazepam (ATIVAN) injection 2 mg  2 mg IntraMUSCular Q6H PRN Alondra Cruz MD      
 LORazepam (ATIVAN) tablet 1 mg  1 mg Oral Q6H PRN Alondra Cruz MD   1 mg at 09/14/18 0817  
 haloperidol (HALDOL) tablet 5 mg  5 mg Oral Q6H PRN Alondra Cruz MD      
 haloperidol lactate (HALDOL) injection 5 mg  5 mg IntraMUSCular Q6H PRN Mansoor Krystal Sacks, MD      
 acetaminophen (TYLENOL) tablet 650 mg  650 mg Oral Q6H PRN Federico Jenkins MD   650 mg at 09/13/18 0125 Compliant with medication:  Yes Side effects from medications:  No  
 
Mental Status Exam 
 
Appearance/Hygiene 63 y.o. BLACK OR  female Hygiene: Fair, dressed in hospital gown, morbidly obeses Behavior/Social Relatedness Entitled, insolent Musculoskeletal Gait/Station: appropriate Tone (flaccid, cogwheeling, spastic): not assessed Psychomotor (hyperkinetic, hypokinetic): calm Involuntary movements (tics, dyskinesias, akathisa, stereotypies): none Speech                          Rate, rhythm,  fluency and articulation are appropriate. Loud volume Mood                          Irritable, angry Affect                                                   congruent Thought Process Linear and goal directed Thought Content and Perceptual Disturbances Denies self-injurious behavior (SIB), suicidal ideation (SI), aggressive behavior or homicidal ideation (HI) 
  Denies auditory and visual hallucinations Sensorium and Cognition              Grossly intact Insight              limited Judgment limited Diagnoses/Impressions:   
Psychiatric:  
 Principal Problem: 
  Major depressive disorder with psychotic features ([facility] [address] (9/10/2018) POA: Yes Active Problems: Chronic post-traumatic stress disorder (PTSD) (9/11/2018) POA: Unknown Cocaine use disorder, severe, dependence ([facility] [address] (9/13/2018) POA: Yes Medical:  
 
Visit Vitals  /88  Pulse 64  Temp 97.2 °F (36.2 °C)  Resp 17  Ht 5' 6\" (1.676 m)  Wt 135.6 kg (299 lb)  Breastfeeding No  
 BMI 48.26 kg/m2 No lab exists for component: 24H Recommendations/Plan:   
 
[]  Dangerous and will not contract for safety in the community []  Response to medications is not adequate 
 
[]  Appropriate disposition not finalized 
 
[]  Collateral history needed to determine safety [x]  Continue current medications and follow MSE for sxs improvement 
 
[]  Medication changes as follows:  
 
[x]  Continue to build rapport [x]  Supportive psychotherapy [x]  Insight oriented therapy [x]  Group attendance/processing 
 
[x]  Relapse prevention 
 
 
[x]  Somatic Management [x]  Disposition planning               Federico Rodriguez MD               9/14/2018          8:49 AM

## 2018-09-14 NOTE — BH NOTES
GROUP THERAPY PROGRESS NOTE Blake Dhiraj is participating in Recreational Therapy. Group time: 1 hour Personal goal for participation: outside Goal orientation: relaxation Group therapy participation: active Therapeutic interventions reviewed and discussed:  
 
Impression of participation:

## 2018-09-15 LAB — VALPROATE SERPL-MCNC: 32 UG/ML (ref 50–100)

## 2018-09-15 PROCEDURE — 36415 COLL VENOUS BLD VENIPUNCTURE: CPT | Performed by: PSYCHIATRY & NEUROLOGY

## 2018-09-15 PROCEDURE — 74011250637 HC RX REV CODE- 250/637: Performed by: PSYCHIATRY & NEUROLOGY

## 2018-09-15 PROCEDURE — 80164 ASSAY DIPROPYLACETIC ACD TOT: CPT | Performed by: PSYCHIATRY & NEUROLOGY

## 2018-09-15 PROCEDURE — 65220000003 HC RM SEMIPRIVATE PSYCH

## 2018-09-15 RX ADMIN — ACETAMINOPHEN 650 MG: 325 TABLET, FILM COATED ORAL at 12:39

## 2018-09-15 RX ADMIN — MELATONIN TAB 3 MG 3 MG: 3 TAB at 20:43

## 2018-09-15 RX ADMIN — AMLODIPINE BESYLATE 10 MG: 10 TABLET ORAL at 09:11

## 2018-09-15 RX ADMIN — VITAMIN D, TAB 1000IU (100/BT) 1000 UNITS: 25 TAB at 09:11

## 2018-09-15 RX ADMIN — ESCITALOPRAM OXALATE 10 MG: 10 TABLET ORAL at 17:57

## 2018-09-15 RX ADMIN — ATENOLOL 25 MG: 25 TABLET ORAL at 20:43

## 2018-09-15 RX ADMIN — DIVALPROEX SODIUM 1000 MG: 500 TABLET, EXTENDED RELEASE ORAL at 09:11

## 2018-09-15 RX ADMIN — LORAZEPAM 1 MG: 1 TABLET ORAL at 12:39

## 2018-09-15 RX ADMIN — METFORMIN HYDROCHLORIDE 500 MG: 500 TABLET, EXTENDED RELEASE ORAL at 16:46

## 2018-09-15 RX ADMIN — TRAZODONE HYDROCHLORIDE 100 MG: 100 TABLET ORAL at 20:43

## 2018-09-15 RX ADMIN — ATENOLOL 25 MG: 25 TABLET ORAL at 09:11

## 2018-09-15 RX ADMIN — GABAPENTIN 300 MG: 300 CAPSULE ORAL at 14:34

## 2018-09-15 RX ADMIN — PANTOPRAZOLE SODIUM 40 MG: 40 TABLET, DELAYED RELEASE ORAL at 06:40

## 2018-09-15 RX ADMIN — GABAPENTIN 300 MG: 300 CAPSULE ORAL at 20:43

## 2018-09-15 RX ADMIN — GABAPENTIN 300 MG: 300 CAPSULE ORAL at 09:11

## 2018-09-15 NOTE — PROGRESS NOTES
[place] [address] Physician Daily Progress Note Patient:  Luis Lo Age:  63 y.o. :  1960 SEX:  female MRN:  [id number] [facility]:  [id number] Admit Date:  9/10/2018 Attending:  Dimas Stoll MD 
 
Subjective: 63year old AA female with a history of Depression, PTSD, HTN and DM who was transferred voluntarily from [facility] for suicidal ideation with plan to cut wrists. She continues to endorse low mood. Patient is generaly isolative and has not been participating in group /milieu activities. She reports feeling depressed and grieving death of sister who  last weeks. Also worried about her 80year old who is in [place]. She is currently on Depakote, denies any clear manic history. She was started on Lexapro. Current Medications:   
Current Facility-Administered Medications Medication Dose Route Frequency Provider Last Rate Last Dose  escitalopram oxalate (LEXAPRO) tablet 10 mg  10 mg Oral QPM Federico Jenkins MD   10 mg at 18 1801  divalproex ER (DEPAKOTE ER) 24 hour tablet 1,000 mg  1,000 mg Oral DAILY Timmy Walker MD   1,000 mg at 09/15/18 0911  
 amLODIPine (NORVASC) tablet 10 mg  10 mg Oral DAILY Radha Shah MD   10 mg at 09/15/18 9125  atenolol (TENORMIN) tablet 25 mg  25 mg Oral Q12H Radha Shah MD   25 mg at 09/15/18 4991  cholecalciferol (VITAMIN D3) tablet 1,000 Units  1,000 Units Oral DAILY Radha Shah MD   1,000 Units at 09/15/18 09  
 gabapentin (NEURONTIN) capsule 300 mg  300 mg Oral TID Radha Shah MD   300 mg at 09/15/18 1434  
 melatonin tablet 3 mg  3 mg Oral QHS Timmy Walker MD   3 mg at 18 210  metFORMIN ER (GLUCOPHAGE XR) tablet 500 mg  500 mg Oral DAILY WITH DINNER Timmy Walker MD   500 mg at 09/15/18 1646  
 pantoprazole (PROTONIX) tablet 40 mg  40 mg Oral ACB Timmy Walker MD   40 mg at 09/15/18 2935  traZODone (DESYREL) tablet 100 mg  100 mg Oral QHS Bob Hernadez MD   100 mg at 09/14/18 2104  albuterol (PROVENTIL VENTOLIN) nebulizer solution 2.5 mg  2.5 mg Nebulization Q4H PRN Bob Hernadez MD      
 traZODone (DESYREL) tablet 50 mg  50 mg Oral QHS PRN Bob Hernadez MD   50 mg at 09/10/18 2104  LORazepam (ATIVAN) injection 2 mg  2 mg IntraMUSCular Q6H PRN Bob Hernadez MD      
 LORazepam (ATIVAN) tablet 1 mg  1 mg Oral Q6H PRN Bob Hernadez MD   1 mg at 09/15/18 1239  
 haloperidol (HALDOL) tablet 5 mg  5 mg Oral Q6H PRN Bob Hernadez MD      
 haloperidol lactate (HALDOL) injection 5 mg  5 mg IntraMUSCular Q6H PRN Federico Prieto MD      
 acetaminophen (TYLENOL) tablet 650 mg  650 mg Oral Q6H PRN Federico Jenkins MD   650 mg at 09/15/18 1239 Compliant with medication:  Yes Side effects from medications:  No  
 
Mental Status Exam 
 
Appearance/Hygiene 63 y.o. BLACK OR  female Hygiene: Fair, dressed in hospital gown, morbidly obeses Behavior/Social Relatedness Entitled, insolent Musculoskeletal Gait/Station: appropriate Tone (flaccid, cogwheeling, spastic): not assessed Psychomotor (hyperkinetic, hypokinetic): calm  
Involuntary movements (tics, dyskinesias, akathisa, stereotypies): none Speech                          Rate, rhythm,  fluency and articulation are appropriate. Loud volume Mood                          Irritable, angry Affect                                                   congruent Thought Process Linear and goal directed Thought Content and Perceptual Disturbances Denies self-injurious behavior (SIB), suicidal ideation (SI), aggressive behavior or homicidal ideation (HI) 
   
Denies auditory and visual hallucinations Sensorium and Cognition              Grossly intact Insight              limited Judgment limited  
  
 
  
Diagnoses/Impressions:    
Psychiatric:  
 Principal Problem: Major depressive disorder with psychotic features ([facility] (9/10/2018) POA: Yes Active Problems: 
  Chronic post-traumatic stress disorder (PTSD) (9/11/2018) POA: Unknown Cocaine use disorder, severe, dependence ([facility] (9/13/2018) POA: Yes Medical:  
 
Visit Vitals  /74  Pulse 61  Temp 97 °F (36.1 °C)  Resp 16  
 Ht 5' 6\" (1.676 m)  Wt 135.6 kg (299 lb)  Breastfeeding No  
 BMI 48.26 kg/m2 No lab exists for component: 24H Recommendations/Plan:   
 
[]  Dangerous and will not contract for safety in the community []  Response to medications is not adequate 
 
[]  Appropriate disposition not finalized 
 
[]  Collateral history needed to determine safety [x]  Continue current medications and follow MSE for sxs improvement 
 
[]  Medication changes as follows:  
 
[x]  Continue to build rapport [x]  Supportive psychotherapy [x]  Insight oriented therapy [x]  Group attendance/processing 
 
[x]  Relapse prevention 
 
 
[x]  Somatic Management [x]  Disposition planning               Federico Lang MD               9/15/2018          5:51 PM

## 2018-09-15 NOTE — BH NOTES
Fay Kim is not participating in Leisure Activity Group. Group time: 2084 Personal goal for participation:  Decrease Anxiety Goal orientation: passive Group therapy participation: refused Therapeutic interventions reviewed and discussed:  Staff encouraged to  choose relaxation activities to decrease anxiety while interacting with peers Impression of participation:  Pt.  refuse chose to rest in bed despite staff encouragement.

## 2018-09-15 NOTE — BSMART NOTE
ART THERAPY GROUP PROGRESS NOTE PATIENT SCHEDULED FOR GROUP AT: 2:30 
 
ATTENDANCE: Patient attended the full group PARTICIPATION LEVEL: Participates fully in the art process ATTENTION LEVEL : Able to focus on task FOCUS: Memory recall SYMBOLIC & THEMATIC CONTENT AS NOTED IN IMAGERY: Group focused on selecting one item from a bin of miscellaneous items that sparked a memory then creating an art piece around the item. Patient was attentive to the group and shared openly about the item she selected and memory it sparked. Patient was interactive with other members and the therapist. She exhibited artistic freedom and was supportive of others that shared by listening and commenting. Patient used humor during her sharing with the group and stated that her nickname is \"Star\".

## 2018-09-15 NOTE — PROGRESS NOTES
Problem: Falls - Risk of 
Goal: *Absence of Falls Document Citlalli Bah Fall Risk and appropriate interventions in the flowsheet. Absent of falls daily while in hospital.  
Fall Risk Interventions: 
Teach pt to rise slowly Keep room clutter free Problem: Depressed Mood (Adult/Pediatric) Goal: *STG: Participates in treatment plan 
Partipates in treatment plan daily while in hospital.  
Outcome: Progressing Towards Goal 
Pt will participate in treatment plan daily during this admission. Goal: *STG: Remains safe in hospital 
Remains safe daily while in hospital.  
Outcome: Progressing Towards Goal 
Pt will remain safe in hospital daily during this admission. Goal: *STG: Complies with medication therapy Complies with medication therapy daily while in hospital.  
Outcome: Progressing Towards Goal 
Pt will comply with medication therapy daily during this admission. Problem: Suicide/Homicide (Adult/Pediatric) Goal: *STG: Seeks staff when feelings of self harm or harm towards others arise Patient will be assessed daily for suicidal ideations and contract for safety. Outcome: Progressing Towards Goal 
Pt will seek staff when feelings of self harm or harm towards others arise daily during this admission. Goal: *STG: Attends activities and groups Patient will be encouraged to attend 2-3 groups daily. Outcome: Progressing Towards Goal 
Pt will attend 2-3 activities/groups daily during this admission. Problem: Psychosis Goal: *STG: Decreased hallucinations Patient will be assessed daily for auditory hallucinations and will have decrease or absence by discharge. Outcome: Progressing Towards Goal 
Pt will have decrease hallucinations daily during this admission. Problem: Hypertension Goal: *Blood pressure within specified parameters Patient will have blood pressure monitored daily.   
Outcome: Progressing Towards Goal 
 Pt blood pressure will remain within normal limits daily during this admission. Comments: Patient has been in isolative to self in day area. She denied suicidal/homicidal ideations. She agreed to seek staff if thoughts of suicide arises. She ate dinner, snack and medication compliant. Nursing will continue to provide a safe and supportive environment.

## 2018-09-15 NOTE — PROGRESS NOTES
Problem: Suicide/Homicide (Adult/Pediatric) Goal: *STG: Remains safe in hospital 
Patient will be assessed daily for safety. Outcome: Progressing Towards Goal 
Remains safe Goal: *STG: Seeks staff when feelings of self harm or harm towards others arise Patient will be assessed daily for suicidal ideations and contract for safety. Outcome: Progressing Towards Goal 
Denies Goal: *STG: Attends activities and groups Patient will be encouraged to attend 2-3 groups daily. Outcome: Progressing Towards Goal 
Attending. Comments: Patient denies suicidal thoughts, ou of room for her meals, encouraged patient to shower. Will continue to monitor and provide a safe environment.

## 2018-09-15 NOTE — BH NOTES
GROUP THERAPY PROGRESS NOTE Zayda Nguyen is participating in Recreational Therapy. Group time: 30 minutes Personal goal for participation: socialize Goal orientation: social 
 
Group therapy participation: active Therapeutic interventions reviewed and discussed: goals and procedures Impression of participation: encouraged

## 2018-09-15 NOTE — BH NOTES
The patient was monitored for safety. Mood was social and calm. No issues with any negative or out of control behaviors. Able to eat all meals from the cafe. Was able to participate in groups and activities. Completed her hygiene on the shift. Stayed out on the milieu during the shift. Staff will continue to monitor for safety and locations.

## 2018-09-16 PROCEDURE — 74011250637 HC RX REV CODE- 250/637: Performed by: PSYCHIATRY & NEUROLOGY

## 2018-09-16 PROCEDURE — 65220000003 HC RM SEMIPRIVATE PSYCH

## 2018-09-16 RX ORDER — DIVALPROEX SODIUM 500 MG/1
1500 TABLET, EXTENDED RELEASE ORAL DAILY
Status: DISCONTINUED | OUTPATIENT
Start: 2018-09-17 | End: 2018-09-18 | Stop reason: HOSPADM

## 2018-09-16 RX ADMIN — AMLODIPINE BESYLATE 10 MG: 10 TABLET ORAL at 08:03

## 2018-09-16 RX ADMIN — LORAZEPAM 1 MG: 1 TABLET ORAL at 06:51

## 2018-09-16 RX ADMIN — VITAMIN D, TAB 1000IU (100/BT) 1000 UNITS: 25 TAB at 08:03

## 2018-09-16 RX ADMIN — GABAPENTIN 300 MG: 300 CAPSULE ORAL at 20:57

## 2018-09-16 RX ADMIN — GABAPENTIN 300 MG: 300 CAPSULE ORAL at 08:03

## 2018-09-16 RX ADMIN — PANTOPRAZOLE SODIUM 40 MG: 40 TABLET, DELAYED RELEASE ORAL at 06:51

## 2018-09-16 RX ADMIN — LORAZEPAM 1 MG: 1 TABLET ORAL at 13:09

## 2018-09-16 RX ADMIN — DIVALPROEX SODIUM 1000 MG: 500 TABLET, EXTENDED RELEASE ORAL at 08:03

## 2018-09-16 RX ADMIN — ATENOLOL 25 MG: 25 TABLET ORAL at 20:56

## 2018-09-16 RX ADMIN — METFORMIN HYDROCHLORIDE 500 MG: 500 TABLET, EXTENDED RELEASE ORAL at 16:47

## 2018-09-16 RX ADMIN — GABAPENTIN 300 MG: 300 CAPSULE ORAL at 13:05

## 2018-09-16 RX ADMIN — TRAZODONE HYDROCHLORIDE 100 MG: 100 TABLET ORAL at 20:56

## 2018-09-16 RX ADMIN — MELATONIN TAB 3 MG 3 MG: 3 TAB at 20:56

## 2018-09-16 RX ADMIN — ESCITALOPRAM OXALATE 10 MG: 10 TABLET ORAL at 17:05

## 2018-09-16 RX ADMIN — ACETAMINOPHEN 650 MG: 325 TABLET, FILM COATED ORAL at 06:51

## 2018-09-16 RX ADMIN — ATENOLOL 25 MG: 25 TABLET ORAL at 08:03

## 2018-09-16 NOTE — BSMART NOTE
ART THERAPY GROUP PROGRESS NOTE PATIENT SCHEDULED FOR GROUP AT: 2:30 
 
ATTENDANCE: Patient attended the full group. PARTICIPATION LEVEL: Participates fully in the art process ATTENTION LEVEL : Able to focus on task FOCUS: Self healing SYMBOLIC & THEMATIC CONTENT AS NOTED IN IMAGERY: Group focused on writing a post card to someone they wished they had the opportunity to say something to then designing the back of the post card. Patient focused her post card toward her mother whom is still alive. She stated that her mother lives in [place] and is in a nursing home and that she wishes she could go out and see her and tell her that she loves her.  Patient was open to sharing with the group and open to questions and comments from other members and the therapist.

## 2018-09-16 NOTE — BH NOTES
GROUP THERAPY PROGRESS NOTE Fay Kim is not  participating in [place]. Staff encouraged and pt declined.

## 2018-09-16 NOTE — BH NOTES
Writer assisted unit nurse per request from evening nursing supervisor for temporary room block with contacting MD regarding room block related to patient agitation and poor hygiene. Patient irritable and easily agitated with peers and staff. Patient is malodorous when in milieu with multiple peer complaints. Despite staff education and encouragement patient has not completed hygiene care.

## 2018-09-16 NOTE — PROGRESS NOTES
[place] [address] Physician Daily Progress Note Patient:  Ember Harris Age:  63 y.o. :  1960 SEX:  female MRN:  [id number] CSN:  [id number] Admit Date:  9/10/2018 Attending:  Uzma Blackwell MD 
 
Subjective: 63year old AA female with a history of Depression, PTSD, HTN and DM who was transferred voluntarily from [facility] for suicidal ideation with plan to cut wrists. She continues to endorse low mood. Patient is generaly isolative and has not been participating in group /milieu activities. She reports feeling depressed and grieving death of sister who  last weeks. Also worried about her 80year old who is in [place]. She is currently on Depakote, denies any clear manic history. Her Depakote level was 32 on 09/15. Will increase dose to 1500  Mg per day.     
  
 
Current Medications:   
Current Facility-Administered Medications Medication Dose Route Frequency Provider Last Rate Last Dose  escitalopram oxalate (LEXAPRO) tablet 10 mg  10 mg Oral QPM Federico Jenkins MD   10 mg at 09/15/18 1757  divalproex ER (DEPAKOTE ER) 24 hour tablet 1,000 mg  1,000 mg Oral DAILY Tariq Hopper MD   1,000 mg at 18 4412  amLODIPine (NORVASC) tablet 10 mg  10 mg Oral DAILY Tariq Hopper MD   10 mg at 18 9542  atenolol (TENORMIN) tablet 25 mg  25 mg Oral Q12H Tariq Hopper MD   25 mg at 18 7623  cholecalciferol (VITAMIN D3) tablet 1,000 Units  1,000 Units Oral DAILY Tariq Hopper MD   1,000 Units at 18 0803  
 gabapentin (NEURONTIN) capsule 300 mg  300 mg Oral TID Tariq Hopper MD   300 mg at 18 0539  melatonin tablet 3 mg  3 mg Oral QHS Tariq Hopper MD   3 mg at 09/15/18 2043  metFORMIN ER (GLUCOPHAGE XR) tablet 500 mg  500 mg Oral DAILY WITH DINNER Ella Walker MD   500 mg at 09/15/18 1646  
 pantoprazole (PROTONIX) tablet 40 mg  40 mg Oral ACB Tariq Hopper MD 40 mg at 09/16/18 3719  traZODone (DESYREL) tablet 100 mg  100 mg Oral QHS Lindsay Vargas MD   100 mg at 09/15/18 2043  albuterol (PROVENTIL VENTOLIN) nebulizer solution 2.5 mg  2.5 mg Nebulization Q4H PRN Lindsay Vargas MD      
 traZODone (DESYREL) tablet 50 mg  50 mg Oral QHS PRN Lindsay Vargas MD   50 mg at 09/10/18 2104  LORazepam (ATIVAN) injection 2 mg  2 mg IntraMUSCular Q6H PRN Lindsay Vargas MD      
 LORazepam (ATIVAN) tablet 1 mg  1 mg Oral Q6H PRN Lindsay Vargas MD   1 mg at 09/16/18 0651  
 haloperidol (HALDOL) tablet 5 mg  5 mg Oral Q6H PRN Lindsay Vargas MD      
 haloperidol lactate (HALDOL) injection 5 mg  5 mg IntraMUSCular Q6H PRN Federico Timmons MD      
 acetaminophen (TYLENOL) tablet 650 mg  650 mg Oral Q6H PRN Federico Timmons MD   650 mg at 09/16/18 6068 Compliant with medication:  Yes Side effects from medications:  No  
 
Mental Status Exam 
 
Appearance/Hygiene 63 y.o. BLACK OR  female Hygiene: Fair, dressed in hospital gown, morbidly obeses Behavior/Social Relatedness Entitled, insolent Musculoskeletal Gait/Station: appropriate Tone (flaccid, cogwheeling, spastic): not assessed Psychomotor (hyperkinetic, hypokinetic): calm  
Involuntary movements (tics, dyskinesias, akathisa, stereotypies): none Speech                          Rate, rhythm,  fluency and articulation are appropriate. Loud volume Mood                          Irritable, angry Affect                                                   congruent Thought Process Linear and goal directed Thought Content and Perceptual Disturbances Denies self-injurious behavior (SIB), suicidal ideation (SI), aggressive behavior or homicidal ideation (HI) 
   
Denies auditory and visual hallucinations Sensorium and Cognition              Grossly intact Insight              limited Judgment limited  
   
 
  
Diagnoses/Impressions:       
Psychiatric:  
 Principal Problem: Major depressive disorder with psychotic features ([facility].) (9/10/2018) POA: Yes Active Problems: 
  Chronic post-traumatic stress disorder (PTSD) (9/11/2018) POA: Unknown Cocaine use disorder, severe, dependence ([facility].) (9/13/2018) POA: Yes Medical:  
 
Visit Vitals  /80  Pulse 61  Temp 97 °F (36.1 °C)  Resp 17  Ht 5' 6\" (1.676 m)  Wt 135.6 kg (299 lb)  Breastfeeding No  
 BMI 48.26 kg/m2 No lab exists for component: 24H Recommendations/Plan:   
 
[]  Dangerous and will not contract for safety in the community []  Response to medications is not adequate 
 
[]  Appropriate disposition not finalized 
 
[]  Collateral history needed to determine safety [x]  Continue current medications and follow MSE for sxs improvement 
 
[]  Medication changes as follows:  
 
[x]  Continue to build rapport [x]  Supportive psychotherapy [x]  Insight oriented therapy [x]  Group attendance/processing 
 
[x]  Relapse prevention 
 
 
[x]  Somatic Management [x]  Disposition planning               Federico Steinberg MD               9/16/2018          8:53 AM

## 2018-09-16 NOTE — PROGRESS NOTES
Problem: Psychosis Goal: *STG: Decreased hallucinations Patient will be assessed daily for auditory hallucinations and will have decrease or absence by discharge. Outcome: Progressing Towards Goal 
Denies Goal: *STG/LTG: Complies with medication therapy Patient will take prescribed medications daily. Outcome: Progressing Towards Goal 
Complaint. Problem: Hypertension Goal: *Blood pressure within specified parameters Patient will have blood pressure monitored daily. Outcome: Progressing Towards Goal 
wnl Comments: Patient out in day area for her meals, she does attend groups. Medication compliant. Hygiene has improved. Her behavior has been appropriate. Voiced no complaints.

## 2018-09-16 NOTE — BH NOTES
GROUP THERAPY PROGRESS NOTE Bin Cox is participating in Recreational Therapy. Group time: 30 minutes Personal goal for participation: Socialization Goal orientation: social 
 
Group therapy participation: active Therapeutic interventions reviewed and discussed: Staff encouraged patient to interact positively through activities. Impression of participation: Patient interacted with others positively.

## 2018-09-17 VITALS
RESPIRATION RATE: 18 BRPM | TEMPERATURE: 98 F | DIASTOLIC BLOOD PRESSURE: 69 MMHG | WEIGHT: 293 LBS | HEART RATE: 66 BPM | SYSTOLIC BLOOD PRESSURE: 116 MMHG | HEIGHT: 66 IN | BODY MASS INDEX: 47.09 KG/M2

## 2018-09-17 LAB
ANION GAP SERPL CALC-SCNC: 6 MMOL/L (ref 3–18)
BUN SERPL-MCNC: 19 MG/DL (ref 7–18)
BUN/CREAT SERPL: 20 (ref 12–20)
CALCIUM SERPL-MCNC: 8.8 MG/DL (ref 8.5–10.1)
CHLORIDE SERPL-SCNC: 107 MMOL/L (ref 100–108)
CO2 SERPL-SCNC: 30 MMOL/L (ref 21–32)
CREAT SERPL-MCNC: 0.93 MG/DL (ref 0.6–1.3)
GLUCOSE SERPL-MCNC: 133 MG/DL (ref 74–99)
POTASSIUM SERPL-SCNC: 4.3 MMOL/L (ref 3.5–5.5)
SODIUM SERPL-SCNC: 143 MMOL/L (ref 136–145)

## 2018-09-17 PROCEDURE — 74011250637 HC RX REV CODE- 250/637: Performed by: PSYCHIATRY & NEUROLOGY

## 2018-09-17 PROCEDURE — 80048 BASIC METABOLIC PNL TOTAL CA: CPT | Performed by: PSYCHIATRY & NEUROLOGY

## 2018-09-17 PROCEDURE — 65220000003 HC RM SEMIPRIVATE PSYCH

## 2018-09-17 PROCEDURE — 36415 COLL VENOUS BLD VENIPUNCTURE: CPT | Performed by: PSYCHIATRY & NEUROLOGY

## 2018-09-17 RX ORDER — LIDOCAINE AND PRILOCAINE 25; 25 MG/G; MG/G
CREAM TOPICAL AS NEEDED
Status: DISCONTINUED | OUTPATIENT
Start: 2018-09-17 | End: 2018-09-18 | Stop reason: HOSPADM

## 2018-09-17 RX ADMIN — AMLODIPINE BESYLATE 10 MG: 10 TABLET ORAL at 08:36

## 2018-09-17 RX ADMIN — PANTOPRAZOLE SODIUM 40 MG: 40 TABLET, DELAYED RELEASE ORAL at 06:43

## 2018-09-17 RX ADMIN — GABAPENTIN 300 MG: 300 CAPSULE ORAL at 13:51

## 2018-09-17 RX ADMIN — ESCITALOPRAM OXALATE 10 MG: 10 TABLET ORAL at 17:56

## 2018-09-17 RX ADMIN — VITAMIN D, TAB 1000IU (100/BT) 1000 UNITS: 25 TAB at 08:36

## 2018-09-17 RX ADMIN — METFORMIN HYDROCHLORIDE 500 MG: 500 TABLET, EXTENDED RELEASE ORAL at 17:56

## 2018-09-17 RX ADMIN — MELATONIN TAB 3 MG 3 MG: 3 TAB at 20:40

## 2018-09-17 RX ADMIN — ATENOLOL 25 MG: 25 TABLET ORAL at 08:36

## 2018-09-17 RX ADMIN — ACETAMINOPHEN 650 MG: 325 TABLET, FILM COATED ORAL at 08:38

## 2018-09-17 RX ADMIN — GABAPENTIN 300 MG: 300 CAPSULE ORAL at 20:40

## 2018-09-17 RX ADMIN — GABAPENTIN 300 MG: 300 CAPSULE ORAL at 08:36

## 2018-09-17 RX ADMIN — ATENOLOL 25 MG: 25 TABLET ORAL at 20:41

## 2018-09-17 RX ADMIN — TRAZODONE HYDROCHLORIDE 100 MG: 100 TABLET ORAL at 20:41

## 2018-09-17 RX ADMIN — DIVALPROEX SODIUM 1500 MG: 500 TABLET, EXTENDED RELEASE ORAL at 08:38

## 2018-09-17 NOTE — BH NOTES
GROUP THERAPY PROGRESS NOTE Calli Cobb is not participating in [employer]. Staff encouraged and pt declined.

## 2018-09-17 NOTE — PROGRESS NOTES
[address] Physician Daily Progress Note Patient:  Blake Hearn Age:  63 y.o. :  1960 SEX:  female MRN:  [id number] CSN:  [id number] Admit Date:  9/10/2018 Attending:  Birdie Becker MD 
 
Subjective:  63year old AA female with a history of Depression, PTSD, HTN and DM who was transferred voluntarily from [facility] for suicidal ideation with plan to cut wrists. She  Reports being in 10/10 pain, but does not have any behavioral symptoms of pain. She claims that she was lidocaine patch for pain from [place]. Also cliams she was on Percocet. Her Depakote level was 32 on 09/15. Dose increased to   1500  Mg per day. She states she cannot say with her niece and wants to move to [place]. Current Medications:   
Current Facility-Administered Medications Medication Dose Route Frequency Provider Last Rate Last Dose  divalproex ER (DEPAKOTE ER) 24 hour tablet 1,500 mg  1,500 mg Oral DAILY Federico Stahl MD   1,500 mg at 18 2694  escitalopram oxalate (LEXAPRO) tablet 10 mg  10 mg Oral QPM Federico Jenkins MD   10 mg at 18 1756  amLODIPine (NORVASC) tablet 10 mg  10 mg Oral DAILY Salvador Whitaker MD   10 mg at 18 0836  
 atenolol (TENORMIN) tablet 25 mg  25 mg Oral Q12H Salvador Whitaker MD   25 mg at 18 1679  cholecalciferol (VITAMIN D3) tablet 1,000 Units  1,000 Units Oral DAILY Salvador Whitaker MD   1,000 Units at 18 0836  
 gabapentin (NEURONTIN) capsule 300 mg  300 mg Oral TID Salvador Whitaker MD   300 mg at 18 1351  melatonin tablet 3 mg  3 mg Oral QHS Salvador Whitaker MD   3 mg at 18 2056  metFORMIN ER (GLUCOPHAGE XR) tablet 500 mg  500 mg Oral DAILY WITH DINNER Salvador Whitaker MD   500 mg at 18 1756  pantoprazole (PROTONIX) tablet 40 mg  40 mg Oral ACB Salvador Whitaker MD   40 mg at 18 5667  traZODone (DESYREL) tablet 100 mg  100 mg Oral QHS Oli Pelaez MD   100 mg at 09/16/18 2056  albuterol (PROVENTIL VENTOLIN) nebulizer solution 2.5 mg  2.5 mg Nebulization Q4H PRN Oli Pelaez MD      
 traZODone (DESYREL) tablet 50 mg  50 mg Oral QHS PRN Oli Pelaez MD   50 mg at 09/10/18 2104  LORazepam (ATIVAN) injection 2 mg  2 mg IntraMUSCular Q6H PRN Oli Pelaez MD      
 LORazepam (ATIVAN) tablet 1 mg  1 mg Oral Q6H PRN Oli Pelaez MD   1 mg at 09/16/18 1309  
 haloperidol (HALDOL) tablet 5 mg  5 mg Oral Q6H PRN Oli Pelaez MD      
 haloperidol lactate (HALDOL) injection 5 mg  5 mg IntraMUSCular Q6H PRN Mansoor Rockie Peabody, MD      
 acetaminophen (TYLENOL) tablet 650 mg  650 mg Oral Q6H PRN Federico Jenkins MD   650 mg at 09/17/18 9967 Compliant with medication:  Yes Side effects from medications:  No  
 
Mental Status Exam 
 
Appearance/Hygiene 63 y.o. BLACK OR  female Hygiene: Fair, dressed in hospital gown, morbidly obeses Behavior/Social Relatedness Entitled, insolent Musculoskeletal Gait/Station: appropriate Tone (flaccid, cogwheeling, spastic): not assessed Psychomotor (hyperkinetic, hypokinetic): calm  
Involuntary movements (tics, dyskinesias, akathisa, stereotypies): none Speech                          Rate, rhythm,  fluency and articulation are appropriate. Loud volume Mood                          Irritable, angry Affect                                                   congruent Thought Process Linear and goal directed Thought Content and Perceptual Disturbances Denies self-injurious behavior (SIB), suicidal ideation (SI), aggressive behavior or homicidal ideation (HI) 
   
Denies auditory and visual hallucinations Sensorium and Cognition              Grossly intact Insight              limited Judgment limited  
   
  
 
  
Diagnoses/Impressions:       
Psychiatric:  
 Principal Problem: Major depressive disorder with psychotic features ([facility].) (9/10/2018) POA: Yes Active Problems: 
  Chronic post-traumatic stress disorder (PTSD) (9/11/2018) POA: Unknown Cocaine use disorder, severe, dependence ([facility].) (9/13/2018) POA: Yes Medical:  
 
Visit Vitals  /72  Pulse 72  Temp 97.2 °F (36.2 °C)  Resp 18  Ht 5' 6\" (1.676 m)  Wt 135.6 kg (299 lb)  Breastfeeding No  
 BMI 48.26 kg/m2 No lab exists for component: 24H Recommendations/Plan:   
 
[]  Dangerous and will not contract for safety in the community []  Response to medications is not adequate 
 
[]  Appropriate disposition not finalized 
 
[]  Collateral history needed to determine safety 
 
[]  Continue current medications and follow MSE for sxs improvement 
 
[x]  Medication changes as follows:  
 
[]  Continue to build rapport [x]  Supportive psychotherapy [x]  Insight oriented therapy [x]  Group attendance/processing 
 
[x]  Relapse prevention 
 
 
[x]  Somatic Management [x]  Disposition planning               Federico Rodriguez MD               9/17/2018          6:37 PM

## 2018-09-17 NOTE — PROGRESS NOTES
completed follow up visit with patient after patient requested a  visit, and offered Pastoral care, see flow sheets for interventions. Patient said she wants to get back to a better life for herself. She asked for prayer for her and her being able to fly to [place] where she says her [de-identified] year old mother lives with family. A daily devotional was also provided with empathetic listening. Chaplains will continue to follow and will provide pastoral care  as needed or requested. Juanito Solorzano MDiv Board Certified [employer] [phone]

## 2018-09-17 NOTE — BSMART NOTE
OCCUPATIONAL THERAPY PROGRESS NOTE Group Time:  0187 Attendance: The patient attended full group. Participation: The patient participated with moderate elaboration in the activity. Attention: The patient was able to focus on the activity. Interaction: The patient occasionally  interacts with others. Affect seemed brighter, interacting more with select peers,  Says she is waiting on the check to get to her relative to get tickets to go to [place]. Patient is concrete with difficulty understanding some concepts.

## 2018-09-17 NOTE — BH NOTES
GROUP THERAPY PROGRESS NOTE Blake Hearn is participating in Recreational Therapy. Group time: 7192 Personal goal for participation: fresh air break/games on the unit Goal orientation: social 
 
Group therapy participation: active Therapeutic interventions reviewed and discussed: Staff encouraged Pt to get off the unit and go outside and get some fresh air, or play games on the unit with peers. Impression of participation: Pt chose to go off the unit play games in the recreation room, and walk outside for fresh air.

## 2018-09-17 NOTE — BH NOTES
Patient complied to prompts from staff. Patient interacted with others in a positive way. Patient was able eat all meals and complied to medication protocol.

## 2018-09-17 NOTE — PROGRESS NOTES
Problem: Falls - Risk of 
Goal: *Absence of Falls Document Edgar Brunner Fall Risk and appropriate interventions in the flowsheet. Absent of falls daily while in hospital.  
Fall Risk Interventions: 
  
 
  
 
Medication Interventions: Teach patient to arise slowly Teach patient to wear skid free footwear. Problem: Depressed Mood (Adult/Pediatric) Goal: *STG: Participates in treatment plan 
Partipates in treatment plan daily while in hospital.  
Outcome: Not Progressing Towards Goal 
Patient will continue to participate in groups. Goal: *STG: Complies with medication therapy Complies with medication therapy daily while in hospital.  
Outcome: Progressing Towards Goal 
Patient is compliant with scheduled medications. Problem: Hypertension Goal: *Blood pressure within specified parameters Patient will have blood pressure monitored daily. Outcome: Progressing Towards Goal 
Patients daily monitored blood pressure will be in WNL. Comments: Patients affect is flat and depressed. Her movements and speech are slow. Patient was medication compliant but became tearful stating that she was experiencing muscle pain. Patient was given Tylenol 650 mg PO. Dosage was effective. Her vital signs were also rechecked. Patient participated in both morning groups. Patient has not voiced any suicidal ideations. She is monitored every 15 minutes for safety.

## 2018-09-17 NOTE — BSMART NOTE
SOCIAL WORK GROUP THERAPY PROGRESS NOTE Group Time:  11am 
 
Group Topic:  Coping Skills    C D Issues Group Participation:   
 
Pt moderately involved during group discussion but remained attentive. No self disclosure other than talking about her d/c plan to [place]. Looked at the \"Process of setting Goals\", the value of a \"daily\" /  \"weekly\" schedule as tool to build self esteem, sharpen decision making skills and help define one's reality. Discussion included the process of making \"Change\" by answering questions on handout with an emphasis on strengths & weaknesses to support improving one's self esteem.

## 2018-09-17 NOTE — BSMART NOTE
SW Contact:  Spoke to pt's niece . Tiffany Means Jaswinder De León # [phone]. .. She confirmed pt has had other checks sent to her home in past & is willing to help again. Her biggest concern is next step after pt gets her money. There have been prior plans to go to [place] but pt spends money on other things (place to stay, friends, drugs). Niece needed to go to work. ... She agreed to phone call with Pt & writer tomorrow at 11am to review d/c plan Pt was updated & O K with this contact.

## 2018-09-17 NOTE — PROGRESS NOTES
NUTRITION Nutrition Screen RECOMMENDATIONS / PLAN:  
 
- Add diabetic diet restriction to diet order. 
- Continue RD inpatient monitoring and evaluation. NUTRITION DIAGNOSIS & INTERVENTIONS:  
 
[x] Meals/snacks: modify composition Nutrition Diagnosis: Altered nutrition related laboratory values related to DM as evidenced by pt with elevated blood glucose levels. ASSESSMENT:  
 
Pt reports good meal intake/appetite currently and PTA. Tolerating diet. Denied having nutritional questions or concerns during visit. Pt with hx of DM, receiving metformin. Average intake adequate to meet patients estimated nutritional needs:   [x] Yes     [] No      [] Unable to determine at this time Diet: DIET REGULAR Food Allergies: Strawberry Current Appetite:   [x] Good     [] Fair     [] Poor     [] Other: 
Appetite/meal intake prior to admission:   [x] Good     [] Fair     [] Poor     [] Other:  
Feeding Limitations:  [] Swallowing Difficulty       [] Chewing Difficulty       [] Other Current Meal Intake: No data found. Gastrointestinal Issues:  [] Yes    [x] No  
Skin Integrity:  WDL Pertinent Medications:  Reviewed: cholecalciferol, metformin Labs:  Reviewed Anthropometrics: 
Ht Readings from Last 1 Encounters:  
09/10/18 5' 6\" (1.676 m) Last 3 Recorded Weights in this Encounter 09/10/18 1655 Weight: 135.6 kg (299 lb) Body mass index is 48.26 kg/(m^2). Obese Class III Weight History:  
Weight Metrics 9/10/2018 Weight 299 lb BMI 48.26 kg/m2 Admitting Diagnosis: unspecified psychosis Psychosis, unspecified psychosis type Past Medical History:  
Diagnosis Date  Arthritis  Depression  [facility]  HTN (hypertension)  Muscle spasm  Pancreatitis  PTSD (post-traumatic stress disorder) Education Needs:        [x] None identified  [] Identified - Not appropriate at this time  []  Identified and addressed - refer to education log Learning Limitations:   [x] None identified  [] Identified Cultural, Mandaen & ethnic food preferences identified:  [x] None    [] Yes ESTIMATED NUTRITION NEEDS:  
 
5763-5906 kcal (MSJx1.2-1.4), 62-78 gm protein (0.8-1 gm/kg), 1 mL/kcal 
Based on: 78 kg       [x] Adjusted BW      [] IBW MONITORING & EVALUATION:  
 
Nutrition Goal(s): 1. Po intake of meals will meet >75% of patient estimated nutritional needs within the next 7 days. Outcome:   [] Met    []  Not Met   [x] New/Initial Goal 
 
Monitor:  [x] Food and beverage intake   [x] Diet order   [x] Nutrition-focused physical findings   [] Weight Previous Recommendations (for follow-up assessments only):     []   Implemented       []   Not Implemented (RD to address)   [] No Longer Appropriate   [] No Recommendation Made Discharge Planning: diabetic diet [x]  Participated in care planning, discharge planning, & interdisciplinary rounds as appropriate Frandy Martins RD Pager: [phone]

## 2018-09-17 NOTE — BSMART NOTE
ART THERAPY GROUP PROGRESS NOTE PATIENT SCHEDULED FOR GROUP AT: 656 ATTENDANCE: 3/4 PARTICIPATION LEVEL: Needs only minimal encouragement ATTENTION LEVEL: Able to focus on task FOCUS: Grounding SYMBOLIC & THEMATIC CONTENT AS NOTED IN IMAGERY: She was calm, compliant, and invested in the task at hand. She interacted with group members and smiled occasionally. She continues to primarily keep to herself and participate minimally in group discussion.

## 2018-09-18 PROCEDURE — 74011250637 HC RX REV CODE- 250/637: Performed by: PSYCHIATRY & NEUROLOGY

## 2018-09-18 RX ORDER — ATENOLOL 25 MG/1
25 TABLET ORAL EVERY 12 HOURS
Qty: 30 TAB | Refills: 0 | Status: SHIPPED | OUTPATIENT
Start: 2018-09-18

## 2018-09-18 RX ORDER — DIVALPROEX SODIUM 500 MG/1
1500 TABLET, EXTENDED RELEASE ORAL DAILY
Qty: 30 TAB | Refills: 0 | Status: SHIPPED | OUTPATIENT
Start: 2018-09-19

## 2018-09-18 RX ORDER — PANTOPRAZOLE SODIUM 40 MG/1
40 TABLET, DELAYED RELEASE ORAL
Qty: 30 TAB | Refills: 0 | Status: SHIPPED | OUTPATIENT
Start: 2018-09-19

## 2018-09-18 RX ORDER — METFORMIN HYDROCHLORIDE 500 MG/1
500 TABLET, EXTENDED RELEASE ORAL
Qty: 30 TAB | Refills: 0 | Status: SHIPPED | OUTPATIENT
Start: 2018-09-18

## 2018-09-18 RX ORDER — AMLODIPINE BESYLATE 10 MG/1
10 TABLET ORAL DAILY
Qty: 30 TAB | Refills: 0 | Status: SHIPPED | OUTPATIENT
Start: 2018-09-19

## 2018-09-18 RX ORDER — TRAZODONE HYDROCHLORIDE 100 MG/1
100 TABLET ORAL
Qty: 30 TAB | Refills: 0 | Status: SHIPPED | OUTPATIENT
Start: 2018-09-18

## 2018-09-18 RX ORDER — GABAPENTIN 300 MG/1
300 CAPSULE ORAL 3 TIMES DAILY
Qty: 90 CAP | Refills: 0 | Status: SHIPPED | OUTPATIENT
Start: 2018-09-18

## 2018-09-18 RX ADMIN — GABAPENTIN 300 MG: 300 CAPSULE ORAL at 08:52

## 2018-09-18 RX ADMIN — DIVALPROEX SODIUM 1500 MG: 500 TABLET, EXTENDED RELEASE ORAL at 08:52

## 2018-09-18 RX ADMIN — ACETAMINOPHEN 650 MG: 325 TABLET, FILM COATED ORAL at 08:52

## 2018-09-18 RX ADMIN — VITAMIN D, TAB 1000IU (100/BT) 1000 UNITS: 25 TAB at 08:52

## 2018-09-18 RX ADMIN — ATENOLOL 25 MG: 25 TABLET ORAL at 08:52

## 2018-09-18 RX ADMIN — AMLODIPINE BESYLATE 10 MG: 10 TABLET ORAL at 08:51

## 2018-09-18 NOTE — BSMART NOTE
SW Contact:  Spoke wit pt regarding UPDATE on delay of her check being sent. She upset & didn't accept explanation. I reminded her she may want to reflect on HER behavior & choices that has put her payee in this situation. .. Pt did not respond. . when reminding her d/c pending & again giving her shelter list & provider home updates, and this was her responsibility to explore, as well as go to local [facility] wherever she'll be staying & she stated she wasn't doing this. Explained to pt hospital guidelines & recommendations and she walked out leaving lists in group room. Encouraged her to reach out to niece again. Pt also repeated a couple times. Taina Thomason \"I'm going to [place]\".

## 2018-09-18 NOTE — DISCHARGE INSTRUCTIONS
BEHAVIORAL HEALTH NURSING DISCHARGE NOTE      The following personal items collected during your admission are returned to you:   Dental Appliance: Dental Appliances: None  Vision: Visual Aid: None  Hearing Aid:    Jewelry: Jewelry: None  Clothing: Clothing: Footwear, Shirt, Shorts, With patient  Other Valuables: Other Valuables: Dudley Adams, Sarah, Chandan  Valuables sent to safe: Personal Items Sent to Safe: none      PATIENT INSTRUCTIONS:           The discharge information has been reviewed with the patient. The patient verbalized understanding.         Patient armband removed and shredded

## 2018-09-18 NOTE — BH NOTES
Pt participated in recreation off the unit,sitting on the bench enjoying the breeze. \"Im ready to be discharged\". Pt has been pleasant   and cooperative in the milieu interacting with staff and peers. Pt. denies SI,HI and AVH today. Pt ate 100% of dinner and snack. Pt contracts for safety on the unit. Pt.denies any new medical/pain complaints. Pt. did not have any visitors. Staff encouraged Pt. to communicate concerns to the Treatment Team to ensure accurate treatment is addressed. Pt agreed. Pt. remain free of falls and provided non skid socks. Staff will continue to monitor Pt. for behavior safety and location.

## 2018-09-18 NOTE — BSMART NOTE
SW Contact: Collateral: spoke 10:15am with pt's PAYEE. Amber Zandra #[phone]  And   Fax #[phone]. Fay Schwab He confirmed. Fay Schwab He's still waiting for [facility] to [facility] of pt's money due to her prior hx. . They in [place] & he's calling them this am to confirm WHEN funds will be released. .. He will call this writer ASAP when he gets the [phone] on his end.

## 2018-09-18 NOTE — DISCHARGE SUMMARY
[address]     Discharge Summary    Patient: Hazel Cota MRN: [id number]  SSN: [id number]    YOB: 1960  Age: 63 y.o. Sex: female        Admit Date: 9/10/2018    Discharge date: 2018     Admission Diagnoses: unspecified psychosis  Psychosis, unspecified psychosis type    Discharge Diagnoses:   Problem List as of 2018  Never Reviewed          Codes Class Noted - Resolved    Cocaine use disorder, severe, dependence ([facility] ICD-10-CM: F14.20  ICD-9-CM: 304.20  2018 - Present        Chronic post-traumatic stress disorder (PTSD) ICD-10-CM: F43.12  ICD-9-CM: 309.81  2018 - Present        * (Principal)Major depressive disorder with psychotic features [facility]) ICD-10-CM: F32.3  ICD-9-CM: 296.24  9/10/2018 - Present              Discharged Condition: [place] Course: Hazel Cota is a 63 y.o. BLACK OR  female with a history of Depression, PTSD, HTN and DM who was transferred voluntarily from [facility] for suicidal ideation with plan to cut wrists. Pt is a very poor historian and responds \"I don't remember to most questions. \"  Pt states she has been depressed for almost a year and was juts discharged from [facility] [place] [address] [address] 4 days ago. She went home, used some Cocaine, felt suicidal again and went to [facility] ED. Trigger for depression is having two abortions in her teenage years and not being able to have biological children. Also states she does not like living in [place] as she has no friends and people don't like her. She wants to move to [place] where her mother lives. Mother has been calling for her to come visit and mother has Dementia. According to records, her sister  recently. Patient was admitted for further assessment. Patient was started on medications. Patient participated in group and milieu activities.  Patient tolerated medications well without any treatment ;imiting side effects. Patient denies any thought of self harm and harming others. Patient was discharged to home. She claimed that she wanted to move to [place] to live with her daughter. Diagnostic Impression    Axis I: MDD, PTSD  Axis II: No diagnosis  Axis III:   Past Medical History:   Diagnosis Date    Arthritis     Depression     DM [facility]diabetes mellitus) ([facility]     HTN (hypertension)     Muscle spasm     Pancreatitis     PTSD (post-traumatic stress disorder)      Axis IV: Acute - Problems with primary support group    Enduring - Problems with primary support group  Axis V: Current - 41-50 serious symptoms     Past Year - 51-60 moderate symptoms                                         Past Medical History:   Past Medical History:   Diagnosis Date    Arthritis     Depression     DM (diabetes mellitus) ([facility]     HTN (hypertension)     Muscle spasm     Pancreatitis     PTSD (post-traumatic stress disorder)        Allergies:    Allergies   Allergen Reactions    Strawberry Rash and Unknown (comments)     Strawberry preserve jelly       Current medications:   Current Facility-Administered Medications   Medication Dose Route Frequency    lidocaine-prilocaine (EMLA) 2.5-2.5 % cream   Topical PRN    divalproex ER (DEPAKOTE ER) 24 hour tablet 1,500 mg  1,500 mg Oral DAILY    escitalopram oxalate (LEXAPRO) tablet 10 mg  10 mg Oral QPM    amLODIPine (NORVASC) tablet 10 mg  10 mg Oral DAILY    atenolol (TENORMIN) tablet 25 mg  25 mg Oral Q12H    cholecalciferol (VITAMIN D3) tablet 1,000 Units  1,000 Units Oral DAILY    gabapentin (NEURONTIN) capsule 300 mg  300 mg Oral TID    melatonin tablet 3 mg  3 mg Oral QHS    metFORMIN ER (GLUCOPHAGE XR) tablet 500 mg  500 mg Oral DAILY WITH DINNER    pantoprazole (PROTONIX) tablet 40 mg  40 mg Oral ACB    traZODone (DESYREL) tablet 100 mg  100 mg Oral QHS    albuterol (PROVENTIL VENTOLIN) nebulizer solution 2.5 mg  2.5 mg Nebulization Q4H PRN    traZODone (DESYREL) tablet 50 mg  50 mg Oral QHS PRN    LORazepam (ATIVAN) injection 2 mg  2 mg IntraMUSCular Q6H PRN    LORazepam (ATIVAN) tablet 1 mg  1 mg Oral Q6H PRN    haloperidol (HALDOL) tablet 5 mg  5 mg Oral Q6H PRN    haloperidol lactate (HALDOL) injection 5 mg  5 mg IntraMUSCular Q6H PRN    acetaminophen (TYLENOL) tablet 650 mg  650 mg Oral Q6H PRN       Outpatient Physicians:            Mental Status Exam      Appearance    General Behavior   Pleasant and cooperative     Speech form and content,  Language  Associations  Form of Thought   Normal flow and volume  TP : Logical, goal oriented   Mood, Affect  Self-Attitude  Vital Sense  SI/HI/PDW   Euthymic  No SI, HI, hopelessness   Abnormal Perceptions and illusions   Denies     Delusions   None   Anxiety    Denies   COGNITION Intelligence Abstraction   Intact   Judgement Insight     Improved         Discharge Disposition: home    Discharge Medications:  Current Discharge Medication List      START taking these medications    Details   metFORMIN ER (GLUCOPHAGE XR) 500 mg tablet Take 1 Tab by mouth daily (with dinner). Indications: type 2 diabetes mellitus  Qty: 30 Tab, Refills: 0      pantoprazole (PROTONIX) 40 mg tablet Take 1 Tab by mouth Daily (before breakfast). Indications: Gastric Ulcer  Qty: 30 Tab, Refills: 0         CONTINUE these medications which have CHANGED    Details   amLODIPine (NORVASC) 10 mg tablet Take 1 Tab by mouth daily. Indications: Chronic Stable Angina Pectoris  Qty: 30 Tab, Refills: 0      atenolol (TENORMIN) 25 mg tablet Take 1 Tab by mouth every twelve (12) hours. Indications: chronic heart failure  Qty: 30 Tab, Refills: 0      divalproex ER (DEPAKOTE ER) 500 mg ER tablet Take 3 Tabs by mouth daily. Indications: DEPRESSION ASSOCIATED WITH BIPOLAR DISORDER  Qty: 30 Tab, Refills: 0      gabapentin (NEURONTIN) 300 mg capsule Take 1 Cap by mouth three (3) times daily.  Indications: NEUROPATHIC PAIN  Qty: 90 Cap, Refills: 0      traZODone (DESYREL) 100 mg tablet Take 1 Tab by mouth nightly. Indications: insomnia associated with depression  Qty: 30 Tab, Refills: 0         CONTINUE these medications which have NOT CHANGED    Details   diclofenac (VOLTAREN) 1 % gel Apply 2 g to affected area four (4) times daily. albuterol (PROVENTIL HFA, VENTOLIN HFA, PROAIR HFA) 90 mcg/actuation inhaler Take 2 Puffs by inhalation. cholecalciferol (VITAMIN D3) 1,000 unit cap Take 1,000 Units by mouth daily. melatonin 3 mg tablet Take 3 mg by mouth.          STOP taking these medications       metFORMIN (GLUMETZA ER) 500 mg TG24 24 hour tablet Comments:   Reason for Stopping:         omeprazole (PRILOSEC) 20 mg capsule Comments:   Reason for Stopping:                           Signed By: Cornel Jacinto MD     September 18, 2018

## 2018-09-18 NOTE — PROGRESS NOTES
Patient refused to review discharge papers and to sign paper work. She was given a bus ticket and all personal items. Patient escorted off nit by staff.

## 2018-09-18 NOTE — PROGRESS NOTES
Problem: Suicide/Homicide (Adult/Pediatric) Goal: *STG: Seeks staff when feelings of self harm or harm towards others arise Patient will be assessed daily for suicidal ideations and contract for safety. Outcome: Progressing Towards Goal 
Denies suicidal thoughts. Goal: *STG/LTG: Complies with medication therapy Patient will take prescribed medication daily. Outcome: Progressing Towards Goal 
Compliant. Problem: Psychosis Goal: *STG: Decreased hallucinations Patient will be assessed daily for auditory hallucinations and will have decrease or absence by discharge. Outcome: Progressing Towards Goal 
Denies auditory hallucinations. Comments: Patient states she is doing well, denies suicidal thoughts and states she does not hear any voices. Stated \" I'm here waiting for my check to come, I'm going to [place] . \" patient then start to whined like a child, no tears, stated \" I miss my mom\". She stopped and then stated she was ok. Encouraged patient to shower, she stated she did her hygiene at night.

## 2018-09-18 NOTE — BSMART NOTE
ART THERAPY GROUP PROGRESS NOTE PATIENT SCHEDULED FOR GROUP AT: 034 ATTENDANCE: 3/4 PARTICIPATION LEVEL: ; Does not engage in the art process or gives up easily. ATTENTION LEVEL: Unable to attend to task at hand. FOCUS: Support SYMBOLIC & THEMATIC CONTENT AS NOTED IN IMAGERY: she was not invested in the task at hand and did not participate despite encouragement. She sat to herself the majority of group. She claimed she \"didn't feel well,\" however was noted to joke with peers outside of group.

## 2018-09-23 ENCOUNTER — HOSPITAL ENCOUNTER (EMERGENCY)
Age: 58
Discharge: ANOTHER HOSPITAL | End: 2018-09-24
Attending: EMERGENCY MEDICINE
Payer: OTHER GOVERNMENT

## 2018-09-23 DIAGNOSIS — T50.902A INTENTIONAL DRUG OVERDOSE, INITIAL ENCOUNTER (HCC): Primary | ICD-10-CM

## 2018-09-23 DIAGNOSIS — Z20.2 STD EXPOSURE: ICD-10-CM

## 2018-09-23 LAB
ALBUMIN SERPL-MCNC: 3 G/DL (ref 3.4–5)
ALBUMIN/GLOB SERPL: 0.8 {RATIO} (ref 0.8–1.7)
ALP SERPL-CCNC: 71 U/L (ref 45–117)
ALT SERPL-CCNC: 19 U/L (ref 13–56)
AMPHET UR QL SCN: NEGATIVE
ANION GAP SERPL CALC-SCNC: 6 MMOL/L (ref 3–18)
APAP SERPL-MCNC: <2 UG/ML (ref 10–30)
AST SERPL-CCNC: 12 U/L (ref 15–37)
BARBITURATES UR QL SCN: NEGATIVE
BASOPHILS # BLD: 0 K/UL (ref 0–0.1)
BASOPHILS NFR BLD: 0 % (ref 0–2)
BENZODIAZ UR QL: NEGATIVE
BILIRUB SERPL-MCNC: 0.1 MG/DL (ref 0.2–1)
BUN SERPL-MCNC: 15 MG/DL (ref 7–18)
BUN/CREAT SERPL: 14 (ref 12–20)
CALCIUM SERPL-MCNC: 8.5 MG/DL (ref 8.5–10.1)
CANNABINOIDS UR QL SCN: NEGATIVE
CHLORIDE SERPL-SCNC: 108 MMOL/L (ref 100–108)
CO2 SERPL-SCNC: 32 MMOL/L (ref 21–32)
COCAINE UR QL SCN: POSITIVE
CREAT SERPL-MCNC: 1.1 MG/DL (ref 0.6–1.3)
DIFFERENTIAL METHOD BLD: ABNORMAL
EOSINOPHIL # BLD: 0.3 K/UL (ref 0–0.4)
EOSINOPHIL NFR BLD: 4 % (ref 0–5)
ERYTHROCYTE [DISTWIDTH] IN BLOOD BY AUTOMATED COUNT: 15.3 % (ref 11.6–14.5)
ETHANOL SERPL-MCNC: <3 MG/DL (ref 0–3)
GLOBULIN SER CALC-MCNC: 3.6 G/DL (ref 2–4)
GLUCOSE SERPL-MCNC: 102 MG/DL (ref 74–99)
HCG UR QL: NEGATIVE
HCT VFR BLD AUTO: 32.6 % (ref 35–45)
HDSCOM,HDSCOM: ABNORMAL
HGB BLD-MCNC: 10.3 G/DL (ref 12–16)
LYMPHOCYTES # BLD: 2.9 K/UL (ref 0.9–3.6)
LYMPHOCYTES NFR BLD: 37 % (ref 21–52)
MCH RBC QN AUTO: 31.4 PG (ref 24–34)
MCHC RBC AUTO-ENTMCNC: 31.6 G/DL (ref 31–37)
MCV RBC AUTO: 99.4 FL (ref 74–97)
METHADONE UR QL: NEGATIVE
MONOCYTES # BLD: 0.5 K/UL (ref 0.05–1.2)
MONOCYTES NFR BLD: 6 % (ref 3–10)
NEUTS SEG # BLD: 4.1 K/UL (ref 1.8–8)
NEUTS SEG NFR BLD: 53 % (ref 40–73)
OPIATES UR QL: NEGATIVE
PCP UR QL: NEGATIVE
PLATELET # BLD AUTO: 207 K/UL (ref 135–420)
PMV BLD AUTO: 9.4 FL (ref 9.2–11.8)
POTASSIUM SERPL-SCNC: 4.3 MMOL/L (ref 3.5–5.5)
PROT SERPL-MCNC: 6.6 G/DL (ref 6.4–8.2)
RBC # BLD AUTO: 3.28 M/UL (ref 4.2–5.3)
SALICYLATES SERPL-MCNC: <2.8 MG/DL (ref 2.8–20)
SERVICE CMNT-IMP: NORMAL
SODIUM SERPL-SCNC: 146 MMOL/L (ref 136–145)
WBC # BLD AUTO: 7.8 K/UL (ref 4.6–13.2)
WET PREP GENITAL: NORMAL

## 2018-09-23 PROCEDURE — 81025 URINE PREGNANCY TEST: CPT | Performed by: EMERGENCY MEDICINE

## 2018-09-23 PROCEDURE — 93005 ELECTROCARDIOGRAM TRACING: CPT

## 2018-09-23 PROCEDURE — 85025 COMPLETE CBC W/AUTO DIFF WBC: CPT | Performed by: EMERGENCY MEDICINE

## 2018-09-23 PROCEDURE — 80307 DRUG TEST PRSMV CHEM ANLYZR: CPT | Performed by: EMERGENCY MEDICINE

## 2018-09-23 PROCEDURE — 74011250636 HC RX REV CODE- 250/636: Performed by: EMERGENCY MEDICINE

## 2018-09-23 PROCEDURE — 80164 ASSAY DIPROPYLACETIC ACD TOT: CPT | Performed by: EMERGENCY MEDICINE

## 2018-09-23 PROCEDURE — 99285 EMERGENCY DEPT VISIT HI MDM: CPT

## 2018-09-23 PROCEDURE — 80053 COMPREHEN METABOLIC PANEL: CPT | Performed by: EMERGENCY MEDICINE

## 2018-09-23 PROCEDURE — 87210 SMEAR WET MOUNT SALINE/INK: CPT | Performed by: EMERGENCY MEDICINE

## 2018-09-23 PROCEDURE — 74011250637 HC RX REV CODE- 250/637: Performed by: PHYSICIAN ASSISTANT

## 2018-09-23 PROCEDURE — 74011250637 HC RX REV CODE- 250/637: Performed by: EMERGENCY MEDICINE

## 2018-09-23 PROCEDURE — 96372 THER/PROPH/DIAG INJ SC/IM: CPT

## 2018-09-23 RX ORDER — METRONIDAZOLE 250 MG/1
2000 TABLET ORAL
Status: COMPLETED | OUTPATIENT
Start: 2018-09-23 | End: 2018-09-23

## 2018-09-23 RX ORDER — AZITHROMYCIN 100 MG/5ML
1000 POWDER, FOR SUSPENSION ORAL
Status: DISCONTINUED | OUTPATIENT
Start: 2018-09-23 | End: 2018-09-23

## 2018-09-23 RX ORDER — AZITHROMYCIN 250 MG/1
1000 TABLET, FILM COATED ORAL
Status: COMPLETED | OUTPATIENT
Start: 2018-09-23 | End: 2018-09-23

## 2018-09-23 RX ADMIN — AZITHROMYCIN 1000 MG: 250 TABLET, FILM COATED ORAL at 15:55

## 2018-09-23 RX ADMIN — METRONIDAZOLE 2000 MG: 250 TABLET ORAL at 15:56

## 2018-09-23 RX ADMIN — Medication 250 MG: at 15:37

## 2018-09-23 NOTE — ED TRIAGE NOTES
Arrived with police after calling veterans hotline. Falling asleep in chair. Says took unknown pills and will lie in traffic due to depression. Picked up at hotel.

## 2018-09-23 NOTE — ED PROVIDER NOTES
EMERGENCY DEPARTMENT HISTORY AND PHYSICAL EXAM 
 
2:39 PM 
 
 
Date: 9/23/2018 Patient Name: Solis Quintana History of Presenting Illness Chief Complaint Patient presents with  Mental Health Problem History Provided By: Patient Chief Complaint: Suicidal ideation Duration: This morning Timing:  Acute Location: Psych Quality: Not reported Severity: Moderate Modifying Factors: No modifying or aggravating factors were reported. Associated Symptoms: Drowsiness, dysuria, hematuria Additional History (Context): Solis Quintana is a 63 y.o. female who presents with acute, moderate suicidal ideations. Patient states that she took an unknown amount of Trazodone around breakfast time to attempt suicide. Now, patient was brought to the ER by police because she planned to lie down in traffic. She seems drowsy. Patient also reports that she had unprotected sex with a friend, and is now experiencing dysuria and hematuria. Patient is currently on her period. She has NKDA. No modifying or aggravating factors were reported. No other symptoms or concerns were expressed. PCP: Misa Perkins MD 
 
Current Outpatient Prescriptions Medication Sig Dispense Refill  amLODIPine (NORVASC) 10 mg tablet Take 1 Tab by mouth daily. Indications: Chronic Stable Angina Pectoris 30 Tab 0  
 atenolol (TENORMIN) 25 mg tablet Take 1 Tab by mouth every twelve (12) hours. Indications: chronic heart failure 30 Tab 0  
 divalproex ER (DEPAKOTE ER) 500 mg ER tablet Take 3 Tabs by mouth daily. Indications: DEPRESSION ASSOCIATED WITH BIPOLAR DISORDER 30 Tab 0  
 gabapentin (NEURONTIN) 300 mg capsule Take 1 Cap by mouth three (3) times daily. Indications: NEUROPATHIC PAIN 90 Cap 0  
 metFORMIN ER (GLUCOPHAGE XR) 500 mg tablet Take 1 Tab by mouth daily (with dinner).  Indications: type 2 diabetes mellitus 30 Tab 0  
 pantoprazole (PROTONIX) 40 mg tablet Take 1 Tab by mouth Daily (before breakfast). Indications: Gastric Ulcer 30 Tab 0  
 traZODone (DESYREL) 100 mg tablet Take 1 Tab by mouth nightly. Indications: insomnia associated with depression 30 Tab 0  
 diclofenac (VOLTAREN) 1 % gel Apply 2 g to affected area four (4) times daily.  albuterol (PROVENTIL HFA, VENTOLIN HFA, PROAIR HFA) 90 mcg/actuation inhaler Take 2 Puffs by inhalation.  cholecalciferol (VITAMIN D3) 1,000 unit cap Take 1,000 Units by mouth daily.  melatonin 3 mg tablet Take 3 mg by mouth. Past History Past Medical History: 
Past Medical History:  
Diagnosis Date  Arthritis  Depression  DM [facility]diabetes mellitus) ([facility]  HTN (hypertension)  Muscle spasm  Pancreatitis  PTSD (post-traumatic stress disorder) Past Surgical History: 
History reviewed. No pertinent surgical history. Family History: 
History reviewed. No pertinent family history. Social History: 
Social History Substance Use Topics  Smoking status: Current Every Day Smoker  Smokeless tobacco: Never Used  Alcohol use Yes Allergies: Allergies Allergen Reactions  Strawberry Rash and Unknown (comments) Strawberry preserve jelly Review of Systems Review of Systems Constitutional: Negative for diaphoresis and fever. Positive for drowsiness. HENT: Negative for congestion and sore throat. Eyes: Negative for pain and itching. Respiratory: Negative for cough and shortness of breath. Cardiovascular: Negative for chest pain and palpitations. Gastrointestinal: Negative for abdominal pain and diarrhea. Endocrine: Negative for polydipsia and polyuria. Genitourinary: Positive for dysuria and hematuria. Musculoskeletal: Negative for arthralgias and myalgias. Skin: Negative for rash and wound. Neurological: Negative for seizures and syncope. Hematological: Does not bruise/bleed easily. Psychiatric/Behavioral: Positive for suicidal ideas.  Negative for agitation and hallucinations. Physical Exam  
 
Patient Vitals for the past 12 hrs: 
 Temp Pulse Resp BP SpO2  
09/23/18 1413 98.6 °F (37 °C) 82 16 153/73 95 % Physical Exam  
Constitutional: She appears well-developed and well-nourished. HENT:  
Head: Normocephalic and atraumatic. Eyes: Conjunctivae are normal. No scleral icterus. Neck: Normal range of motion. Neck supple. No JVD present. Cardiovascular: Normal rate, regular rhythm and intact distal pulses. Pulmonary/Chest: Effort normal. No respiratory distress. Musculoskeletal: Normal range of motion. Neurological: She is alert. Skin: Skin is warm and dry. Psychiatric: Judgment and thought content normal. She is not actively hallucinating. She exhibits a depressed mood. Thought process is linear. Nursing note and vitals reviewed. Diagnostic Study Results Labs - No results found for this or any previous visit (from the past 12 hour(s)). Radiologic Studies - No orders to display No results found. Medications ordered:  
Medications - No data to display Medical Decision Making Initial Medical Decision Making and DDx: 
Trazodone overdose. Depression. Also wants evaluation for STDs. ED Course: Progress Notes, Reevaluation, and Consults: 
ED Course Consult:  Discussed care with [facility]). Standard discussion; including history of patients chief complaint, available diagnostic results, and treatment course. At this time patient says she took Trazodone. She recommends 6 hour of observation and 16 hour obs for extended release. 2:34 PM, 9/23/2018  
 
2:48 PM: Pelvic exam, collected swabs. Chaperone by Gustavo Mendoza RN. I am the first provider for this patient. I reviewed the vital signs, available nursing notes, past medical history, past surgical history, family history and social history. Vital Signs-Reviewed the patient's vital signs. Pulse Oximetry Analysis - 95% on room air Cardiac Monitor: 
Rate/Rhythm:  82 bpm, NSR Records Reviewed: Nursing Notes (Time of Review: 2:39 PM) Diagnosis Clinical Impression: No diagnosis found. 2:58 PM : Pt care transferred to [place], ED provider. History of patient complaint(s), available diagnostic reports and current treatment plan has been discussed thoroughly. Bedside rounding on patient occured : yes. Intended disposition of patient : TBD Pending diagnostics reports and/or labs (please list): Labs and EKG pending. STD exposure, medications ordered. Plan for 16 hour obs and psych evaluation. [facility] Follow-up Information None Patient's Medications Start Taking No medications on file Continue Taking ALBUTEROL (PROVENTIL HFA, VENTOLIN HFA, PROAIR HFA) 90 MCG/ACTUATION INHALER    Take 2 Puffs by inhalation. AMLODIPINE (NORVASC) 10 MG TABLET    Take 1 Tab by mouth daily. Indications: Chronic Stable Angina Pectoris ATENOLOL (TENORMIN) 25 MG TABLET    Take 1 Tab by mouth every twelve (12) hours. Indications: chronic heart failure CHOLECALCIFEROL (VITAMIN D3) 1,000 UNIT CAP    Take 1,000 Units by mouth daily. DICLOFENAC (VOLTAREN) 1 % GEL    Apply 2 g to affected area four (4) times daily. DIVALPROEX ER (DEPAKOTE ER) 500 MG ER TABLET    Take 3 Tabs by mouth daily. Indications: DEPRESSION ASSOCIATED WITH BIPOLAR DISORDER  
 GABAPENTIN (NEURONTIN) 300 MG CAPSULE    Take 1 Cap by mouth three (3) times daily. Indications: NEUROPATHIC PAIN  
 MELATONIN 3 MG TABLET    Take 3 mg by mouth. METFORMIN ER (GLUCOPHAGE XR) 500 MG TABLET    Take 1 Tab by mouth daily (with dinner). Indications: type 2 diabetes mellitus PANTOPRAZOLE (PROTONIX) 40 MG TABLET    Take 1 Tab by mouth Daily (before breakfast). Indications: Gastric Ulcer TRAZODONE (DESYREL) 100 MG TABLET    Take 1 Tab by mouth nightly. Indications: insomnia associated with depression These Medications have changed No medications on file Stop Taking No medications on file  
 
_______________________________ Attestations: 
Scribe Attestation Demario Faustin acting as a scribe for and in the presence of Nicole Colon MD     
September 23, 2018 at 2:39 PM 
    
Provider Attestation:     
I personally performed the services described in the documentation, reviewed the documentation, as recorded by the scribe in my presence, and it accurately and completely records my words and actions. September 23, 2018 at 2:39 PM - Nicole Colon MD   
_______________________________

## 2018-09-23 NOTE — ED NOTES
PATIENT BELONGINGS: 
 
*1 white paper bag, 1 patient belongings bag in locker #3 * 3 Med bags sent to pharmacy *Envelope locked up with security

## 2018-09-23 NOTE — ED NOTES
2:59 PM :Pt care assumed from Dr. Camelia Arnett , ED provider. Pt complaint(s), current treatment plan, progression and available diagnostic results have been discussed thoroughly. Rounding occurred: yes Intended Disposition: TBD Pending diagnostic reports and/or labs (please list): labs, ekg, med clearance and psych eval 
Pelvic exam already completed at this time. Pt resting in bed in no distress. Eddie Angulo PA-C 
 
5:48 PM 
Pt ambulating to restroom with strong, steady gait. telepsych consult placed. Eddie Angulo PA-C 
 
12:01 AM 
telepsych in to see patient. Recommends inpatient voluntary admission at this time. Per note, if pt requests discharge, she will need involuntary admission. Pt resting at this time with no complaints. Eddie Angulo PA-C 
 
 
1:48 AM : Pt care transferred to Dr. Errol Puri  ,ED provider. History of patient complaint(s), available diagnostic reports and current treatment plan has been discussed thoroughly. Bedside rounding on patient occured : no . Intended disposition of patient : pending bed placement for voluntary admit at this time Pending diagnostics reports and/or labs (please list): n/a Eddie Anguol PA-C

## 2018-09-23 NOTE — ED NOTES
Patient ambulated to restroom for urine specimen collection, noted to be very unsteady on feet but keeps saying \"I'm fine now, I feel much better\". Patient permitted to sit at edge of bed for comfort, sitter close by to ensure patient does not fall. Bed in lowest locked position. Patient provided with gingerale and crackers, diet orders placed by attending physician. Patient tells this nurse \"I stopped taking my blood pressure pills because I want to die\". Physician notified.

## 2018-09-23 NOTE — ED NOTES
Patient placed back in the bed, with side rails up x2. Provided with blanket and pillow for comfort. No longer assuming care of this patient, report given to charge nurse Porter Tam.

## 2018-09-23 NOTE — ED NOTES
Pt reported to Dr. Murphy Gama she took unknown amount of trazadone at breakfast this morning. Patient very drowsy and continues to fall asleep, unsteady on feet. Dr. Murphy Gama called poison control

## 2018-09-24 VITALS
TEMPERATURE: 97.8 F | OXYGEN SATURATION: 100 % | WEIGHT: 293 LBS | HEART RATE: 80 BPM | RESPIRATION RATE: 16 BRPM | BODY MASS INDEX: 48.42 KG/M2 | SYSTOLIC BLOOD PRESSURE: 135 MMHG | DIASTOLIC BLOOD PRESSURE: 85 MMHG

## 2018-09-24 LAB
ATRIAL RATE: 72 BPM
ATRIAL RATE: 75 BPM
ATRIAL RATE: 99 BPM
CALCULATED P AXIS, ECG09: 61 DEGREES
CALCULATED P AXIS, ECG09: 65 DEGREES
CALCULATED P AXIS, ECG09: 69 DEGREES
CALCULATED R AXIS, ECG10: 10 DEGREES
CALCULATED R AXIS, ECG10: 15 DEGREES
CALCULATED R AXIS, ECG10: 26 DEGREES
CALCULATED T AXIS, ECG11: 24 DEGREES
CALCULATED T AXIS, ECG11: 29 DEGREES
CALCULATED T AXIS, ECG11: 54 DEGREES
DIAGNOSIS, 93000: NORMAL
P-R INTERVAL, ECG05: 146 MS
P-R INTERVAL, ECG05: 188 MS
P-R INTERVAL, ECG05: 192 MS
Q-T INTERVAL, ECG07: 330 MS
Q-T INTERVAL, ECG07: 402 MS
Q-T INTERVAL, ECG07: 418 MS
QRS DURATION, ECG06: 76 MS
QRS DURATION, ECG06: 76 MS
QRS DURATION, ECG06: 80 MS
QTC CALCULATION (BEZET), ECG08: 423 MS
QTC CALCULATION (BEZET), ECG08: 440 MS
QTC CALCULATION (BEZET), ECG08: 466 MS
VALPROATE SERPL-MCNC: 84 UG/ML (ref 50–100)
VENTRICULAR RATE, ECG03: 72 BPM
VENTRICULAR RATE, ECG03: 75 BPM
VENTRICULAR RATE, ECG03: 99 BPM

## 2018-09-24 PROCEDURE — 74011250637 HC RX REV CODE- 250/637: Performed by: PHYSICIAN ASSISTANT

## 2018-09-24 RX ORDER — ACETAMINOPHEN 500 MG
TABLET ORAL
Status: DISPENSED
Start: 2018-09-24 | End: 2018-09-24

## 2018-09-24 RX ORDER — ACETAMINOPHEN 500 MG
1000 TABLET ORAL
Status: COMPLETED | OUTPATIENT
Start: 2018-09-24 | End: 2018-09-24

## 2018-09-24 RX ADMIN — ACETAMINOPHEN 1000 MG: 500 TABLET, FILM COATED ORAL at 01:22

## 2018-09-24 NOTE — ANCILLARY DISCHARGE INSTRUCTIONS
[facility] Felicity at [address] [place] [address] and they are on diversion.

## 2018-09-24 NOTE — ANCILLARY DISCHARGE INSTRUCTIONS
Dr. Flor Chamberlain from [facility] called back, no beds at this time. Request that I fax patient's information for consideration when discharges become available.

## 2018-09-24 NOTE — ED NOTES
Report called to Cristofer Trimble at [facility]. Sending over consent for treatment form that needs to be faxed back to them.

## 2018-09-24 NOTE — ED NOTES
[facility] called to attempt to obtain an inpatient psychiatric bed. No available beds at this time.

## 2018-09-24 NOTE — CONSULTS
Name: Ramona Hawk    : 1960  Date:  2018    T macy: [id number]  Location of patient: [facility] Location of doctor:    [facility]  This evaluation was conducted via telepsychiatry with the assistance of onsite staff    Chief Complaint: Depressed, suicide attempt by overdose on Trazodone earlier this morning. Now wanting to lay in road to be run over by car. Patient was brought in by police for continued suicidal ideation. History of Present Illness: Patient is a 50-year-old, single, -American lady with history of depression and PTSD, presented to the emergency department today brought by police after she allegedly took an unknown amount of trazodone earlier this morning in an attempt to end her life. She then later on wanted to lay in the street so that she can be run over by a car. Patient was a limited historian secondary to drowsiness and sedation. Patient shared that she took an unknown amount of trazodone earlier, now continues to have plans of suicide by laying down in traffic. Patient states, \"I just don't want to live anymore. \" Patient admits that she has been noncompliant with treatment for some time, when questioned, she states \"I don't know. \" When discussing her history of mental health diagnosis, she adamantly denied that she was diagnosed with bipolar disorder but she does endorse a period of at least four or five days when she feels very energetic and not needing sleep, she answers \"I don't know\" when questioned about other symptoms of bipolar disorder. Patient admits that she has a substance use issue, she reports dependence on crack cocaine, alcohol, and cigarettes. She states \"I need help for that but no one would help me. \" She reports last use of crack cocaine yesterday, last use of alcohol yesterday drinking roughly a six pack of beer. Patient continues to endorse suicidal ideation yet she wants help for treatment of drug/alcohol abuse/dependence.  Patient is agreeable to be admitted to inpatient psychiatry for dual diagnosis treatment at this time if available. Collateral:No collateral information available. Patient shares that she does not have any family or friends who can provide this. SI/ Self harm: Patient does endorse history of suicidal ideation and self injure his behaviors. HI/Violence: Denied  Access to weapons: Denied  Legal: Denied  Psychiatric History/Treatment History: Patient was unreliable in providing this information as she reports \"I don't remember. \" Patient does admit to history of inpatient and outpatient treatment, admits to noncompliant with treatment. Drug/Alcohol History:Patient reports long history of utilizing crack cocaine, alcohol drinking at least a six pack per day, and smoking cigarettes. Patient's last use of all above substances was yesterday. Medical History: Obesity, hypertension  Medications & Freq: Patient had stopped taking all of her medications including anti-hypertensive medications. Allergies: No known drug allergies. Patient is allergic to strawberries. Family Psych History/History of suicide: Patient could not provide  Social History: Patient has been homeless since March 2018. She reports that she has no family or friends, \"everybody hates me. \" She is unemployed. Multiple stressors include homelessness, financial stressors, lacking of support. Mental Status Exam:   Appearance and attire: Patient was extremely disheveled on the parents, she was dressed in hospital gown. Attitude and behavior: She was groggy, sedated most likely secondary to her overdose on trazodone earlier this morning, she attempts to be cooperative but often answers questions with \"I don't remember\" or \"I don't know. \"  Speech: Paucity of speech, slightly rapid when she does answer  Affect and mood: Restricted affect, slightly reactive, slightly labile, mood is \"depressed. \"  Association and thought processes: Vague but otherwise linear, logical, and goal-directed. Thought content: Patient continues to endorse suicidal ideation with plans to lay in for traffic and be run over by a car. She denies homicidal ideation. Denies paranoid ideations or delusions though she keeps on repeating that \"everybody hates me. \"  Perception: Patient denies auditory or visual hallucinations. Sensorium, memory, and orientation: Limited memory, unknown as to the reason for this. She is alert but groggy, oriented times four. Intellectual functioning: Limited  Insight and judgment: Poor at this time as patient lacks insight into her current life situation and making poor decisions, wanting to end her own life and placing others in danger. Impression/Risk Assessment: 50-year-old lady presents by police with continue suicidal ideation with plans to lay in traffic to be run over by a car. Patient had attempted to end her life earlier today by taking an overdose of unknown amounts of trazodone. She was very groggy and slightly sedated on interview. She does have a history of mental illness, diagnosed with depression and PTSD. Upon review of her medical records and past medications, she was taking Depakote 1500 mg at bedtime which suggest a potential diagnosis of bipolar disorder. Patient adamantly denies the diagnosis of bipolar. Patient admits to being noncompliant with treatment, does not recall the last time she took her medications or the last time when she saw her outpatient psychiatrist. She continues to use crack cocaine and alcohol, she does acknowledge that she needs help with this. Patient continues to exhibit poor insight and judgment, she remains a risk of harm to herself and others. She is agreeable to inpatient psychiatric admission with dual diagnosis if possible. Plan: voluntary admit    Diagnosis:  Unspecified depressive disorder. Cocaine use disorder. Alcohol use disorder. Unspecified depressive disorder. PTSD by history.   Treatment Recommendations: Patient is agreeable to inpatient psychiatric hospitalization with dual diagnosis treatment at this time. If she requests discharge, she will meet criteria for involuntary commitment at that time. Recommend monitoring for potential alcohol withdrawal with Ativan coverage. Pharmacological: None recommended at this time. Therapy: Would benefit from supportive psychotherapy, substance abuse therapy.    Level of Care: inpatient

## 2018-09-24 NOTE — ED NOTES
Multiple attempts made to call report using the number case mgmt placed in their notes. Case mgmt notified that number was not good. Asked case mgmt for another number.

## 2018-09-24 NOTE — ED NOTES
6:05 AM :Pt care assumed from Dr. Francisco Sotelo, ED provider. Pt complaint(s), current treatment plan, progression and available diagnostic results have been discussed thoroughly. Rounding occurred: N/A Intended Disposition: Transfer Pending diagnostic reports and/or labs (please list): Pending placement by case management Scribe Attestation Andres Simsds acting as a scribe for and in the presence of Anita Whaley MD     
September 24, 2018 at 6:05 AM 
    
Provider Attestation:     
I personally performed the services described in the documentation, reviewed the documentation, as recorded by the scribe in my presence, and it accurately and completely records my words and actions.  September 24, 2018 at 6:05 AM - Anita Whaley MD

## 2018-09-24 NOTE — ANCILLARY DISCHARGE INSTRUCTIONS
Dr. Ira Tejada called stating that they can accept patient. Accepting physician is Dr. Selena Galo. Nurse can call report to [phone].

## 2018-09-24 NOTE — ED NOTES
Note:  Assuming care of patient  
from leaving provider 3:10 AM 
Tomeka JUNIOR MD, assumed care of patient from another provider who is ending their shift in the emergency department . 3:10 AM 
 
Date: 9/23/2018 Patient Name: Abelino Hurtado History of Presenting Illness Chief Complaint Patient presents with  Mental Health Problem Nursing notes regarding the HPI and triage nursing notes were reviewed. Prior medical records were reviewed. Current Facility-Administered Medications Medication Dose Route Frequency Provider Last Rate Last Dose  acetaminophen (TYLENOL) 500 mg tablet Current Outpatient Prescriptions Medication Sig Dispense Refill  amLODIPine (NORVASC) 10 mg tablet Take 1 Tab by mouth daily. Indications: Chronic Stable Angina Pectoris 30 Tab 0  
 atenolol (TENORMIN) 25 mg tablet Take 1 Tab by mouth every twelve (12) hours. Indications: chronic heart failure 30 Tab 0  
 divalproex ER (DEPAKOTE ER) 500 mg ER tablet Take 3 Tabs by mouth daily. Indications: DEPRESSION ASSOCIATED WITH BIPOLAR DISORDER 30 Tab 0  
 gabapentin (NEURONTIN) 300 mg capsule Take 1 Cap by mouth three (3) times daily. Indications: NEUROPATHIC PAIN 90 Cap 0  
 metFORMIN ER (GLUCOPHAGE XR) 500 mg tablet Take 1 Tab by mouth daily (with dinner). Indications: type 2 diabetes mellitus 30 Tab 0  
 pantoprazole (PROTONIX) 40 mg tablet Take 1 Tab by mouth Daily (before breakfast). Indications: Gastric Ulcer 30 Tab 0  
 traZODone (DESYREL) 100 mg tablet Take 1 Tab by mouth nightly. Indications: insomnia associated with depression 30 Tab 0  
 diclofenac (VOLTAREN) 1 % gel Apply 2 g to affected area four (4) times daily.  albuterol (PROVENTIL HFA, VENTOLIN HFA, PROAIR HFA) 90 mcg/actuation inhaler Take 2 Puffs by inhalation.  cholecalciferol (VITAMIN D3) 1,000 unit cap Take 1,000 Units by mouth daily.  melatonin 3 mg tablet Take 3 mg by mouth. Past History Past Medical History: 
Past Medical History:  
Diagnosis Date  Arthritis  Depression  DM (diabetes mellitus) ([address]  HTN (hypertension)  Muscle spasm  Pancreatitis  PTSD (post-traumatic stress disorder) Past Surgical History: 
History reviewed. No pertinent surgical history. Family History: 
History reviewed. No pertinent family history. Social History: 
Social History Substance Use Topics  Smoking status: Current Every Day Smoker  Smokeless tobacco: Never Used  Alcohol use Yes Allergies: Allergies Allergen Reactions  Strawberry Rash and Unknown (comments) Strawberry preserve jelly Patient's primary care provider (as noted in EPIC):  Nina Gee MD 
 
Abnormal lab results from this emergency department encounter: 
Labs Reviewed CBC WITH AUTOMATED DIFF - Abnormal; Notable for the following:   
    Result Value RBC 3.28 (*) HGB 10.3 (*) HCT 32.6 (*) MCV 99.4 (*)   
 RDW 15.3 (*) All other components within normal limits METABOLIC PANEL, COMPREHENSIVE - Abnormal; Notable for the following:   
 Sodium 146 (*) Glucose 102 (*)   
 GFR est non-AA 51 (*) Bilirubin, total 0.1 (*) AST (SGOT) 12 (*) Albumin 3.0 (*) All other components within normal limits SALICYLATE - Abnormal; Notable for the following:   
 Salicylate level <1.3 (*) All other components within normal limits ACETAMINOPHEN - Abnormal; Notable for the following:   
 Acetaminophen level <2 (*) All other components within normal limits DRUG SCREEN, URINE - Abnormal; Notable for the following:   
 COCAINE POSITIVE (*) All other components within normal limits WET PREP  
CHLAMYDIA/GC PCR  
ETHYL ALCOHOL  
HCG URINE, QL  
VALPROIC ACID Lab values for this patient within approximately the last 12 hours: 
Recent Results (from the past 12 hour(s)) EKG, 12 LEAD, INITIAL  Collection Time: 09/23/18  3:25 PM  
 Result Value Ref Range Ventricular Rate 72 BPM  
 Atrial Rate 72 BPM  
 P-R Interval 188 ms QRS Duration 80 ms  
 Q-T Interval 402 ms QTC Calculation (Bezet) 440 ms Calculated P Axis 69 degrees Calculated R Axis 15 degrees Calculated T Axis 29 degrees Diagnosis Normal sinus rhythm Nonspecific T wave abnormality Abnormal ECG When compared with ECG of 23-SEP-2018 15:10, 
QRS voltage has decreased Nonspecific T wave abnormality now evident in Inferior leads Nonspecific T wave abnormality, worse in Lateral leads HCG URINE, QL Collection Time: 09/23/18  3:36 PM  
Result Value Ref Range HCG urine, QL NEGATIVE  NEG    
CBC WITH AUTOMATED DIFF Collection Time: 09/23/18  3:40 PM  
Result Value Ref Range WBC 7.8 4.6 - 13.2 K/uL  
 RBC 3.28 (L) 4.20 - 5.30 M/uL  
 HGB 10.3 (L) 12.0 - 16.0 g/dL HCT 32.6 (L) 35.0 - 45.0 % MCV 99.4 (H) 74.0 - 97.0 FL  
 MCH 31.4 24.0 - 34.0 PG  
 MCHC 31.6 31.0 - 37.0 g/dL  
 RDW 15.3 (H) 11.6 - 14.5 % PLATELET 822 618 - 024 K/uL MPV 9.4 9.2 - 11.8 FL  
 NEUTROPHILS 53 40 - 73 % LYMPHOCYTES 37 21 - 52 % MONOCYTES 6 3 - 10 % EOSINOPHILS 4 0 - 5 % BASOPHILS 0 0 - 2 %  
 ABS. NEUTROPHILS 4.1 1.8 - 8.0 K/UL  
 ABS. LYMPHOCYTES 2.9 0.9 - 3.6 K/UL  
 ABS. MONOCYTES 0.5 0.05 - 1.2 K/UL  
 ABS. EOSINOPHILS 0.3 0.0 - 0.4 K/UL  
 ABS. BASOPHILS 0.0 0.0 - 0.1 K/UL  
 DF AUTOMATED METABOLIC PANEL, COMPREHENSIVE Collection Time: 09/23/18  3:40 PM  
Result Value Ref Range Sodium 146 (H) 136 - 145 mmol/L Potassium 4.3 3.5 - 5.5 mmol/L Chloride 108 100 - 108 mmol/L  
 CO2 32 21 - 32 mmol/L Anion gap 6 3.0 - 18 mmol/L Glucose 102 (H) 74 - 99 mg/dL BUN 15 7.0 - 18 MG/DL Creatinine 1.10 0.6 - 1.3 MG/DL  
 BUN/Creatinine ratio 14 12 - 20 GFR est AA >60 >60 ml/min/1.73m2 GFR est non-AA 51 (L) >60 ml/min/1.73m2 Calcium 8.5 8.5 - 10.1 MG/DL  Bilirubin, total 0.1 (L) 0.2 - 1.0 MG/DL  
 ALT (SGPT) 19 13 - 56 U/L  
 AST (SGOT) 12 (L) 15 - 37 U/L Alk. phosphatase 71 45 - 117 U/L Protein, total 6.6 6.4 - 8.2 g/dL Albumin 3.0 (L) 3.4 - 5.0 g/dL Globulin 3.6 2.0 - 4.0 g/dL A-G Ratio 0.8 0.8 - 1.7 ETHYL ALCOHOL Collection Time: 09/23/18  3:40 PM  
Result Value Ref Range ALCOHOL(ETHYL),SERUM <3 0 - 3 MG/DL  
SALICYLATE Collection Time: 09/23/18  3:40 PM  
Result Value Ref Range Salicylate level <8.4 (L) 2.8 - 20.0 MG/DL  
ACETAMINOPHEN Collection Time: 09/23/18  3:40 PM  
Result Value Ref Range Acetaminophen level <2 (L) 10.0 - 30.0 ug/mL VALPROIC ACID Collection Time: 09/23/18  3:40 PM  
Result Value Ref Range Valproic acid 84 50 - 100 ug/ml DRUG SCREEN, URINE Collection Time: 09/23/18  5:45 PM  
Result Value Ref Range BENZODIAZEPINES NEGATIVE  NEG    
 BARBITURATES NEGATIVE  NEG    
 THC (TH-CANNABINOL) NEGATIVE  NEG    
 OPIATES NEGATIVE  NEG    
 PCP(PHENCYCLIDINE) NEGATIVE  NEG    
 COCAINE POSITIVE (A) NEG    
 AMPHETAMINES NEGATIVE  NEG METHADONE NEGATIVE  NEG HDSCOM (NOTE) EKG, 12 LEAD, SUBSEQUENT Collection Time: 09/23/18  7:29 PM  
Result Value Ref Range Ventricular Rate 75 BPM  
 Atrial Rate 75 BPM  
 P-R Interval 192 ms QRS Duration 76 ms  
 Q-T Interval 418 ms QTC Calculation (Bezet) 466 ms Calculated P Axis 65 degrees Calculated R Axis 10 degrees Calculated T Axis 24 degrees Diagnosis Normal sinus rhythm Normal ECG When compared with ECG of 23-SEP-2018 15:25, No significant change was found Radiologist and cardiologist interpretations if available at time of this note: 
Radiology results: No results found. Medication(s) ordered for patient during this emergency visit encounter: 
Medications  
acetaminophen (TYLENOL) 500 mg tablet (not administered)  
cefTRIAXone (ROCEPHIN) 250 mg in lidocaine (PF) (XYLOCAINE) 10 mg/mL (1 %) IM injection (250 mg IntraMUSCular Given 9/23/18 8638) metroNIDAZOLE (FLAGYL) tablet 2,000 mg (2,000 mg Oral Given 9/23/18 1556) azithromycin (ZITHROMAX) tablet 1,000 mg (1,000 mg Oral Given 9/23/18 1555)  
acetaminophen (TYLENOL) tablet 1,000 mg (1,000 mg Oral Given 9/24/18 0122) Pt care assumed from Maira Early , ED provider. Pt complaint(s), current treatment plan, progression and available diagnostic results have been discussed thoroughly. Rounding occurred: no Intended Disposition: Transfer Pending diagnostic reports and/or labs (please list):  [facility] case management transfer of a voluntary SI patient to a community behavioral medicine facility. Signout Note Pt care transferred to Dr. Mauro Barr  ,ED provider. History of patient complaint(s), available diagnostic reports and current treatment plan has been discussed thoroughly. Bedside rounding on patient occured : no . Intended disposition of patient : Transfer Pending diagnostics reports and/or labs (please list):  [facility] case management transfer of a voluntary SI patient to a community behavioral medicine facility. Coding Diagnoses Clinical Impression:  
1. Intentional drug overdose, initial encounter ([facility] [address] 2. STD exposure Disposition Disposition:  Transfer Dewanda Opitz L. Evetta Dingwall, M.D. PHILLY Board Certified Emergency Physician

## 2018-09-24 NOTE — ED NOTES
Made contact with Unit secretary at [facility] - told to call back because they have no knowledge of this patient coming to them

## 2018-09-24 NOTE — ED NOTES
Patient transferred to [facility] Psychiatric Department by [facility] transport services.

## 2019-03-23 NOTE — PROGRESS NOTES
Received a call from Juancarlos Carson from [facility] and states that Dr Corinne Mora has accepted pt at [facility]. Pt is going to Room 105 Bed 2. David Zaidi, pt's nurse, was made aware and information given and will call report to [phone]. ED MD and pt made aware. [facility], , was made aware of disposition and will arrange transport. normal...

## 2020-10-16 NOTE — BSMART NOTE
ART THERAPY GROUP PROGRESS NOTE PATIENT SCHEDULED FOR GROUP AT: 009 ATTENDANCE: Full PARTICIPATION LEVEL: Participates fully in the art process ATTENTION LEVEL: Able to focus on task FOCUS: Grounding/ anxiety reduction SYMBOLIC & THEMATIC CONTENT AS NOTED IN IMAGERY: She was quiet and kept to herself unless directly prompted. She was invested in the task at hand, however was a bit guarded and did not participate in group discussion. Hx of multiple kidney stones  -Continue Allopurinol